# Patient Record
Sex: FEMALE | Race: WHITE | Employment: OTHER | URBAN - METROPOLITAN AREA
[De-identification: names, ages, dates, MRNs, and addresses within clinical notes are randomized per-mention and may not be internally consistent; named-entity substitution may affect disease eponyms.]

---

## 2018-02-23 ENCOUNTER — OFFICE VISIT (OUTPATIENT)
Dept: HEMATOLOGY/ONCOLOGY | Age: 83
End: 2018-02-23
Attending: INTERNAL MEDICINE
Payer: MEDICARE

## 2018-02-23 VITALS
HEIGHT: 64 IN | BODY MASS INDEX: 26.53 KG/M2 | RESPIRATION RATE: 18 BRPM | SYSTOLIC BLOOD PRESSURE: 194 MMHG | TEMPERATURE: 97 F | WEIGHT: 155.38 LBS | HEART RATE: 65 BPM | OXYGEN SATURATION: 99 % | DIASTOLIC BLOOD PRESSURE: 83 MMHG

## 2018-02-23 DIAGNOSIS — Z17.0 CARCINOMA OF OVERLAPPING SITES OF LEFT BREAST IN FEMALE, ESTROGEN RECEPTOR POSITIVE (HCC): Primary | ICD-10-CM

## 2018-02-23 DIAGNOSIS — C50.812 CARCINOMA OF OVERLAPPING SITES OF LEFT BREAST IN FEMALE, ESTROGEN RECEPTOR POSITIVE (HCC): Primary | ICD-10-CM

## 2018-02-23 DIAGNOSIS — Z51.81 ENCOUNTER FOR MONITORING CARDIOTOXIC DRUG THERAPY: ICD-10-CM

## 2018-02-23 DIAGNOSIS — Z79.899 ENCOUNTER FOR MONITORING CARDIOTOXIC DRUG THERAPY: ICD-10-CM

## 2018-02-23 PROCEDURE — 99205 OFFICE O/P NEW HI 60 MIN: CPT | Performed by: INTERNAL MEDICINE

## 2018-02-23 NOTE — PROGRESS NOTES
Request for medical records sent to [facility] at [facility]

## 2018-02-26 ENCOUNTER — HOSPITAL ENCOUNTER (OUTPATIENT)
Dept: CV DIAGNOSTICS | Facility: HOSPITAL | Age: 83
Discharge: HOME | End: 2018-02-26
Attending: INTERNAL MEDICINE
Payer: MEDICARE

## 2018-02-26 ENCOUNTER — OFFICE VISIT (OUTPATIENT)
Dept: HEMATOLOGY/ONCOLOGY | Age: 83
End: 2018-02-26
Attending: INTERNAL MEDICINE
Payer: MEDICARE

## 2018-02-26 DIAGNOSIS — Z17.0 CARCINOMA OF OVERLAPPING SITES OF LEFT BREAST IN FEMALE, ESTROGEN RECEPTOR POSITIVE (HCC): Primary | ICD-10-CM

## 2018-02-26 DIAGNOSIS — Z79.899 ENCOUNTER FOR MONITORING CARDIOTOXIC DRUG THERAPY: ICD-10-CM

## 2018-02-26 DIAGNOSIS — C50.812 CARCINOMA OF OVERLAPPING SITES OF LEFT BREAST IN FEMALE, ESTROGEN RECEPTOR POSITIVE (HCC): Primary | ICD-10-CM

## 2018-02-26 DIAGNOSIS — C50.812 CARCINOMA OF OVERLAPPING SITES OF LEFT BREAST IN FEMALE, ESTROGEN RECEPTOR POSITIVE (HCC): ICD-10-CM

## 2018-02-26 DIAGNOSIS — Z71.9 ENCOUNTER FOR EDUCATION: ICD-10-CM

## 2018-02-26 DIAGNOSIS — Z51.81 ENCOUNTER FOR MONITORING CARDIOTOXIC DRUG THERAPY: ICD-10-CM

## 2018-02-26 DIAGNOSIS — Z17.0 CARCINOMA OF OVERLAPPING SITES OF LEFT BREAST IN FEMALE, ESTROGEN RECEPTOR POSITIVE (HCC): ICD-10-CM

## 2018-02-26 PROCEDURE — 93306 TTE W/DOPPLER COMPLETE: CPT | Performed by: INTERNAL MEDICINE

## 2018-02-26 PROCEDURE — 99214 OFFICE O/P EST MOD 30 MIN: CPT | Performed by: NURSE PRACTITIONER

## 2018-02-26 RX ORDER — ONDANSETRON HYDROCHLORIDE 8 MG/1
8 TABLET, FILM COATED ORAL EVERY 8 HOURS PRN
Qty: 30 TABLET | Refills: 3 | Status: SHIPPED | OUTPATIENT
Start: 2018-02-26 | End: 2019-04-12

## 2018-02-26 RX ORDER — PROCHLORPERAZINE MALEATE 10 MG
10 TABLET ORAL EVERY 6 HOURS PRN
Qty: 30 TABLET | Refills: 3 | Status: SHIPPED | OUTPATIENT
Start: 2018-02-26 | End: 2019-04-12

## 2018-02-26 NOTE — CONSULTS
Cancer Center Report of Consultation    Patient Name: Alex April   YOB: 1930   Medical Record Number: [id number]   CSN: [id number]   Consulting Physician: Sofia Salinas MD  Referring Physician(s): No ref.  provider found  Date of Consultati the summer. She will be staying with her daughter. She has been very active. She has no complaint of pain. She has no fever or sweats. She has no dyspnea or cough. She has no abdominal pain. Her appetite is good. Past Medical History:  History reviewed. Signs:   BP (!) 194/83 (BP Location: Right leg, Patient Position: Sitting, Cuff Size: large)   Pulse 65   Temp (!) 97.2 °F (36.2 °C) (Tympanic)   Resp 18   Ht 1.626 m (5' 4\")   Wt 70.5 kg (155 lb 6.4 oz)   SpO2 99%   BMI 26.67 kg/m²     Physical Examinatio that she would have difficulty tolerating this regimen given her age. I discussed the option of low dose weekly paclitaxel with trastuzumab (+/- pertuzumab). I discussed this regimen in detail.  I discussed the significant risk of neuropathy in her given he

## 2018-02-26 NOTE — PROGRESS NOTES
IV Chemotherapy Education    Patient: Kinsey Green   Date: 2/26/18   Diagnosis:  Breast cancer  Caregivers present:     Drug names: Pertuzumab, Taxol, Herceptin    Treatment Effects on Bone Marrow:  Chemotherapy action on cancer / normal cells}  Function management, when to call provider and contact information.      Coreen Harris FNP-BC  [facility]

## 2018-02-27 PROBLEM — M19.90 OSTEOARTHROSIS: Status: ACTIVE | Noted: 2018-02-27

## 2018-02-27 PROBLEM — M81.0 OSTEOPOROSIS: Status: ACTIVE | Noted: 2018-02-27

## 2018-03-02 ENCOUNTER — APPOINTMENT (OUTPATIENT)
Dept: HEMATOLOGY/ONCOLOGY | Age: 83
End: 2018-03-02
Attending: INTERNAL MEDICINE
Payer: MEDICARE

## 2018-03-07 ENCOUNTER — LAB ENCOUNTER (OUTPATIENT)
Dept: LAB | Facility: HOSPITAL | Age: 83
End: 2018-03-07
Attending: INTERNAL MEDICINE
Payer: MEDICARE

## 2018-03-07 DIAGNOSIS — C80.1 DUCTAL CARCINOMA (HCC): Primary | ICD-10-CM

## 2018-03-07 DIAGNOSIS — C50.812 CARCINOMA OF OVERLAPPING SITES OF LEFT BREAST IN FEMALE, ESTROGEN RECEPTOR POSITIVE (HCC): Primary | ICD-10-CM

## 2018-03-07 DIAGNOSIS — Z17.0 CARCINOMA OF OVERLAPPING SITES OF LEFT BREAST IN FEMALE, ESTROGEN RECEPTOR POSITIVE (HCC): Primary | ICD-10-CM

## 2018-03-07 PROCEDURE — 88321 CONSLTJ&REPRT SLD PREP ELSWR: CPT

## 2018-03-09 ENCOUNTER — OFFICE VISIT (OUTPATIENT)
Dept: HEMATOLOGY/ONCOLOGY | Age: 83
End: 2018-03-09
Attending: INTERNAL MEDICINE
Payer: MEDICARE

## 2018-03-09 VITALS
WEIGHT: 154.19 LBS | DIASTOLIC BLOOD PRESSURE: 79 MMHG | BODY MASS INDEX: 26 KG/M2 | HEART RATE: 64 BPM | OXYGEN SATURATION: 98 % | TEMPERATURE: 98 F | RESPIRATION RATE: 18 BRPM | SYSTOLIC BLOOD PRESSURE: 188 MMHG

## 2018-03-09 DIAGNOSIS — Z17.0 CARCINOMA OF OVERLAPPING SITES OF LEFT BREAST IN FEMALE, ESTROGEN RECEPTOR POSITIVE (HCC): ICD-10-CM

## 2018-03-09 DIAGNOSIS — C50.812 CARCINOMA OF OVERLAPPING SITES OF LEFT BREAST IN FEMALE, ESTROGEN RECEPTOR POSITIVE (HCC): Primary | ICD-10-CM

## 2018-03-09 DIAGNOSIS — Z17.0 CARCINOMA OF OVERLAPPING SITES OF LEFT BREAST IN FEMALE, ESTROGEN RECEPTOR POSITIVE (HCC): Primary | ICD-10-CM

## 2018-03-09 DIAGNOSIS — C50.812 CARCINOMA OF OVERLAPPING SITES OF LEFT BREAST IN FEMALE, ESTROGEN RECEPTOR POSITIVE (HCC): ICD-10-CM

## 2018-03-09 LAB
ALBUMIN SERPL-MCNC: 3.6 G/DL (ref 3.5–4.8)
ALP LIVER SERPL-CCNC: 57 U/L (ref 55–142)
ALT SERPL-CCNC: 35 U/L (ref 14–54)
AST SERPL-CCNC: 28 U/L (ref 15–41)
BASOPHILS # BLD AUTO: 0.03 X10(3) UL (ref 0–0.1)
BASOPHILS NFR BLD AUTO: 0.5 %
BILIRUB SERPL-MCNC: 0.5 MG/DL (ref 0.1–2)
BUN BLD-MCNC: 22 MG/DL (ref 8–20)
CALCIUM BLD-MCNC: 8.7 MG/DL (ref 8.3–10.3)
CHLORIDE: 107 MMOL/L (ref 101–111)
CO2: 30 MMOL/L (ref 22–32)
CREAT BLD-MCNC: 0.85 MG/DL (ref 0.55–1.02)
EOSINOPHIL # BLD AUTO: 0.45 X10(3) UL (ref 0–0.3)
EOSINOPHIL NFR BLD AUTO: 7.1 %
ERYTHROCYTE [DISTWIDTH] IN BLOOD BY AUTOMATED COUNT: 13.8 % (ref 11.5–16)
GLUCOSE BLD-MCNC: 112 MG/DL (ref 70–99)
HCT VFR BLD AUTO: 38.7 % (ref 34–50)
HGB BLD-MCNC: 12.7 G/DL (ref 12–16)
IMMATURE GRANULOCYTE COUNT: 0.01 X10(3) UL (ref 0–1)
IMMATURE GRANULOCYTE RATIO %: 0.2 %
LYMPHOCYTES # BLD AUTO: 1.95 X10(3) UL (ref 0.9–4)
LYMPHOCYTES NFR BLD AUTO: 30.9 %
M PROTEIN MFR SERPL ELPH: 6.6 G/DL (ref 6.1–8.3)
MCH RBC QN AUTO: 30 PG (ref 27–33.2)
MCHC RBC AUTO-ENTMCNC: 32.8 G/DL (ref 31–37)
MCV RBC AUTO: 91.5 FL (ref 81–100)
MONOCYTES # BLD AUTO: 0.35 X10(3) UL (ref 0.1–1)
MONOCYTES NFR BLD AUTO: 5.5 %
NEUTROPHIL ABS PRELIM: 3.52 X10 (3) UL (ref 1.3–6.7)
NEUTROPHILS # BLD AUTO: 3.52 X10(3) UL (ref 1.3–6.7)
NEUTROPHILS NFR BLD AUTO: 55.8 %
PLATELET # BLD AUTO: 172 10(3)UL (ref 150–450)
POTASSIUM SERPL-SCNC: 4.2 MMOL/L (ref 3.6–5.1)
RBC # BLD AUTO: 4.23 X10(6)UL (ref 3.8–5.1)
RED CELL DISTRIBUTION WIDTH-SD: 46.5 FL (ref 35.1–46.3)
SODIUM SERPL-SCNC: 144 MMOL/L (ref 136–144)
WBC # BLD AUTO: 6.3 X10(3) UL (ref 4–13)

## 2018-03-09 PROCEDURE — 96413 CHEMO IV INFUSION 1 HR: CPT

## 2018-03-09 PROCEDURE — S0028 INJECTION, FAMOTIDINE, 20 MG: HCPCS | Performed by: INTERNAL MEDICINE

## 2018-03-09 PROCEDURE — 96375 TX/PRO/DX INJ NEW DRUG ADDON: CPT

## 2018-03-09 PROCEDURE — 96411 CHEMO IV PUSH ADDL DRUG: CPT

## 2018-03-09 PROCEDURE — 99214 OFFICE O/P EST MOD 30 MIN: CPT | Performed by: INTERNAL MEDICINE

## 2018-03-09 PROCEDURE — 96417 CHEMO IV INFUS EACH ADDL SEQ: CPT

## 2018-03-09 RX ORDER — LORAZEPAM 2 MG/ML
INJECTION INTRAMUSCULAR EVERY 4 HOURS PRN
Status: CANCELLED | OUTPATIENT
Start: 2018-03-09

## 2018-03-09 RX ORDER — PROCHLORPERAZINE MALEATE 10 MG
10 TABLET ORAL EVERY 6 HOURS PRN
Status: CANCELLED | OUTPATIENT
Start: 2018-03-16

## 2018-03-09 RX ORDER — ACETAMINOPHEN 325 MG/1
650 TABLET ORAL ONCE
Status: CANCELLED | OUTPATIENT
Start: 2018-03-09

## 2018-03-09 RX ORDER — METOCLOPRAMIDE HYDROCHLORIDE 5 MG/ML
10 INJECTION INTRAMUSCULAR; INTRAVENOUS EVERY 6 HOURS PRN
Status: CANCELLED | OUTPATIENT
Start: 2018-03-09

## 2018-03-09 RX ORDER — ONDANSETRON 2 MG/ML
8 INJECTION INTRAMUSCULAR; INTRAVENOUS EVERY 6 HOURS PRN
Status: CANCELLED | OUTPATIENT
Start: 2018-03-23

## 2018-03-09 RX ORDER — METOCLOPRAMIDE HYDROCHLORIDE 5 MG/ML
10 INJECTION INTRAMUSCULAR; INTRAVENOUS EVERY 6 HOURS PRN
Status: CANCELLED | OUTPATIENT
Start: 2018-03-16

## 2018-03-09 RX ORDER — DIPHENHYDRAMINE HYDROCHLORIDE 50 MG/ML
25 INJECTION INTRAMUSCULAR; INTRAVENOUS ONCE
Status: COMPLETED | OUTPATIENT
Start: 2018-03-09 | End: 2018-03-09

## 2018-03-09 RX ORDER — LORAZEPAM 2 MG/ML
INJECTION INTRAMUSCULAR EVERY 4 HOURS PRN
Status: CANCELLED | OUTPATIENT
Start: 2018-03-16

## 2018-03-09 RX ORDER — LORAZEPAM 2 MG/ML
INJECTION INTRAMUSCULAR EVERY 4 HOURS PRN
Status: CANCELLED | OUTPATIENT
Start: 2018-03-23

## 2018-03-09 RX ORDER — ACETAMINOPHEN 325 MG/1
650 TABLET ORAL ONCE
Status: COMPLETED | OUTPATIENT
Start: 2018-03-09 | End: 2018-03-09

## 2018-03-09 RX ORDER — ONDANSETRON 2 MG/ML
8 INJECTION INTRAMUSCULAR; INTRAVENOUS EVERY 6 HOURS PRN
Status: CANCELLED | OUTPATIENT
Start: 2018-03-09

## 2018-03-09 RX ORDER — FAMOTIDINE 10 MG/ML
20 INJECTION, SOLUTION INTRAVENOUS ONCE
Status: COMPLETED | OUTPATIENT
Start: 2018-03-09 | End: 2018-03-09

## 2018-03-09 RX ORDER — LORAZEPAM 0.5 MG/1
TABLET ORAL EVERY 4 HOURS PRN
Status: CANCELLED | OUTPATIENT
Start: 2018-03-09

## 2018-03-09 RX ORDER — LORAZEPAM 0.5 MG/1
TABLET ORAL EVERY 4 HOURS PRN
Status: CANCELLED | OUTPATIENT
Start: 2018-03-23

## 2018-03-09 RX ORDER — LORAZEPAM 0.5 MG/1
TABLET ORAL EVERY 4 HOURS PRN
Status: CANCELLED | OUTPATIENT
Start: 2018-03-16

## 2018-03-09 RX ORDER — METOCLOPRAMIDE HYDROCHLORIDE 5 MG/ML
10 INJECTION INTRAMUSCULAR; INTRAVENOUS EVERY 6 HOURS PRN
Status: CANCELLED | OUTPATIENT
Start: 2018-03-23

## 2018-03-09 RX ORDER — ONDANSETRON 2 MG/ML
8 INJECTION INTRAMUSCULAR; INTRAVENOUS EVERY 6 HOURS PRN
Status: CANCELLED | OUTPATIENT
Start: 2018-03-16

## 2018-03-09 RX ORDER — PROCHLORPERAZINE MALEATE 10 MG
10 TABLET ORAL EVERY 6 HOURS PRN
Status: CANCELLED | OUTPATIENT
Start: 2018-03-09

## 2018-03-09 RX ORDER — PROCHLORPERAZINE MALEATE 10 MG
10 TABLET ORAL EVERY 6 HOURS PRN
Status: CANCELLED | OUTPATIENT
Start: 2018-03-23

## 2018-03-09 RX ADMIN — ACETAMINOPHEN 650 MG: 325 TABLET ORAL at 11:07:00

## 2018-03-09 RX ADMIN — DIPHENHYDRAMINE HYDROCHLORIDE 25 MG: 50 INJECTION INTRAMUSCULAR; INTRAVENOUS at 11:00:00

## 2018-03-09 RX ADMIN — FAMOTIDINE 20 MG: 10 INJECTION, SOLUTION INTRAVENOUS at 10:59:00

## 2018-03-09 NOTE — PATIENT INSTRUCTIONS
Take Zofran 8mg tonight around 7:00pm then every 8 hours Sat/Sun at least. If still having nausea with Zofran, may alternate with Compazine every 4 hours.

## 2018-03-09 NOTE — PROGRESS NOTES
Cancer Center Progress Note    Problem List:      Patient Active Problem List:     Carcinoma of overlapping sites of left breast in female, estrogen receptor positive ([facility] [address]     Osteoporosis     Osteoarthrosis      Interim History:    I did obtain the outsi edema. No calf tenderness.       Labs:       Lab Results  Component Value Date   WBC 6.3 03/09/2018   RBC 4.23 03/09/2018   HGB 12.7 03/09/2018   HCT 38.7 03/09/2018   MCV 91.5 03/09/2018   MCH 30.0 03/09/2018   MCHC 32.8 03/09/2018   RDW 13.8 03/09/2018

## 2018-03-13 ENCOUNTER — TELEPHONE (OUTPATIENT)
Dept: HEMATOLOGY/ONCOLOGY | Facility: HOSPITAL | Age: 83
End: 2018-03-13

## 2018-03-13 NOTE — TELEPHONE ENCOUNTER
Date of Treatment: 3/9/18                                Type of Chemo: Taxol/Herceptin    Comments: LM for patient at listed home number.     Recommendations: pt scheduled for this Fri- encouraged her to call back with any issues or questions prior to this

## 2018-03-16 ENCOUNTER — OFFICE VISIT (OUTPATIENT)
Dept: HEMATOLOGY/ONCOLOGY | Age: 83
End: 2018-03-16
Attending: INTERNAL MEDICINE
Payer: MEDICARE

## 2018-03-16 VITALS
HEART RATE: 63 BPM | TEMPERATURE: 98 F | DIASTOLIC BLOOD PRESSURE: 72 MMHG | SYSTOLIC BLOOD PRESSURE: 188 MMHG | BODY MASS INDEX: 26 KG/M2 | RESPIRATION RATE: 16 BRPM | WEIGHT: 154.31 LBS | OXYGEN SATURATION: 100 %

## 2018-03-16 DIAGNOSIS — Z17.0 CARCINOMA OF OVERLAPPING SITES OF LEFT BREAST IN FEMALE, ESTROGEN RECEPTOR POSITIVE (HCC): Primary | ICD-10-CM

## 2018-03-16 DIAGNOSIS — C50.812 CARCINOMA OF OVERLAPPING SITES OF LEFT BREAST IN FEMALE, ESTROGEN RECEPTOR POSITIVE (HCC): Primary | ICD-10-CM

## 2018-03-16 LAB
BASOPHILS # BLD AUTO: 0.03 X10(3) UL (ref 0–0.1)
BASOPHILS NFR BLD AUTO: 0.6 %
EOSINOPHIL # BLD AUTO: 0.35 X10(3) UL (ref 0–0.3)
EOSINOPHIL NFR BLD AUTO: 7.2 %
ERYTHROCYTE [DISTWIDTH] IN BLOOD BY AUTOMATED COUNT: 13.8 % (ref 11.5–16)
HCT VFR BLD AUTO: 37.4 % (ref 34–50)
HGB BLD-MCNC: 12 G/DL (ref 12–16)
IMMATURE GRANULOCYTE COUNT: 0.03 X10(3) UL (ref 0–1)
IMMATURE GRANULOCYTE RATIO %: 0.6 %
LYMPHOCYTES # BLD AUTO: 1.77 X10(3) UL (ref 0.9–4)
LYMPHOCYTES NFR BLD AUTO: 36.6 %
MCH RBC QN AUTO: 29.6 PG (ref 27–33.2)
MCHC RBC AUTO-ENTMCNC: 32.1 G/DL (ref 31–37)
MCV RBC AUTO: 92.1 FL (ref 81–100)
MONOCYTES # BLD AUTO: 0.21 X10(3) UL (ref 0.1–1)
MONOCYTES NFR BLD AUTO: 4.3 %
NEUTROPHIL ABS PRELIM: 2.45 X10 (3) UL (ref 1.3–6.7)
NEUTROPHILS # BLD AUTO: 2.45 X10(3) UL (ref 1.3–6.7)
NEUTROPHILS NFR BLD AUTO: 50.7 %
PLATELET # BLD AUTO: 224 10(3)UL (ref 150–450)
RBC # BLD AUTO: 4.06 X10(6)UL (ref 3.8–5.1)
RED CELL DISTRIBUTION WIDTH-SD: 47.1 FL (ref 35.1–46.3)
WBC # BLD AUTO: 4.8 X10(3) UL (ref 4–13)

## 2018-03-16 PROCEDURE — 96413 CHEMO IV INFUSION 1 HR: CPT

## 2018-03-16 PROCEDURE — 85025 COMPLETE CBC W/AUTO DIFF WBC: CPT

## 2018-03-16 PROCEDURE — S0028 INJECTION, FAMOTIDINE, 20 MG: HCPCS | Performed by: INTERNAL MEDICINE

## 2018-03-16 PROCEDURE — 96417 CHEMO IV INFUS EACH ADDL SEQ: CPT

## 2018-03-16 PROCEDURE — 96375 TX/PRO/DX INJ NEW DRUG ADDON: CPT

## 2018-03-16 RX ORDER — FAMOTIDINE 10 MG/ML
20 INJECTION, SOLUTION INTRAVENOUS ONCE
Status: CANCELLED
Start: 2018-03-23 | End: 2018-03-23

## 2018-03-16 RX ORDER — FAMOTIDINE 10 MG/ML
20 INJECTION, SOLUTION INTRAVENOUS ONCE
Status: COMPLETED | OUTPATIENT
Start: 2018-03-16 | End: 2018-03-16

## 2018-03-16 RX ORDER — DIPHENHYDRAMINE HYDROCHLORIDE 50 MG/ML
25 INJECTION INTRAMUSCULAR; INTRAVENOUS ONCE
Status: COMPLETED | OUTPATIENT
Start: 2018-03-16 | End: 2018-03-16

## 2018-03-16 RX ORDER — DIPHENHYDRAMINE HYDROCHLORIDE 50 MG/ML
25 INJECTION INTRAMUSCULAR; INTRAVENOUS ONCE
Status: CANCELLED
Start: 2018-03-23 | End: 2018-03-23

## 2018-03-16 RX ORDER — SODIUM CHLORIDE 0.9 % (FLUSH) 0.9 %
10 SYRINGE (ML) INJECTION ONCE
Status: CANCELLED | OUTPATIENT
Start: 2018-03-16

## 2018-03-16 RX ORDER — SODIUM CHLORIDE 0.9 % (FLUSH) 0.9 %
10 SYRINGE (ML) INJECTION ONCE
Status: DISCONTINUED | OUTPATIENT
Start: 2018-03-16 | End: 2018-03-16

## 2018-03-16 RX ADMIN — DIPHENHYDRAMINE HYDROCHLORIDE 25 MG: 50 INJECTION INTRAMUSCULAR; INTRAVENOUS at 10:45:00

## 2018-03-16 RX ADMIN — FAMOTIDINE 20 MG: 10 INJECTION, SOLUTION INTRAVENOUS at 10:46:00

## 2018-03-16 NOTE — PROGRESS NOTES
Education Record  Learner:  Patient and daughter  Disease / Diagnosis:   Breast cancer  Barriers / Limitations:  None  Method:  Printed material and Reinforcement  General Topics:  Plan of care reviewed; labs; side effects potential  Outcome:  Shows unders

## 2018-03-23 ENCOUNTER — OFFICE VISIT (OUTPATIENT)
Dept: HEMATOLOGY/ONCOLOGY | Age: 83
End: 2018-03-23
Attending: INTERNAL MEDICINE
Payer: MEDICARE

## 2018-03-23 VITALS
WEIGHT: 154 LBS | BODY MASS INDEX: 26 KG/M2 | OXYGEN SATURATION: 99 % | HEART RATE: 62 BPM | RESPIRATION RATE: 18 BRPM | SYSTOLIC BLOOD PRESSURE: 182 MMHG | TEMPERATURE: 98 F | DIASTOLIC BLOOD PRESSURE: 82 MMHG

## 2018-03-23 DIAGNOSIS — C50.812 CARCINOMA OF OVERLAPPING SITES OF LEFT BREAST IN FEMALE, ESTROGEN RECEPTOR POSITIVE (HCC): Primary | ICD-10-CM

## 2018-03-23 DIAGNOSIS — Z17.0 CARCINOMA OF OVERLAPPING SITES OF LEFT BREAST IN FEMALE, ESTROGEN RECEPTOR POSITIVE (HCC): Primary | ICD-10-CM

## 2018-03-23 LAB
BASOPHILS # BLD AUTO: 0.02 X10(3) UL (ref 0–0.1)
BASOPHILS NFR BLD AUTO: 0.5 %
EOSINOPHIL # BLD AUTO: 0.21 X10(3) UL (ref 0–0.3)
EOSINOPHIL NFR BLD AUTO: 5.3 %
ERYTHROCYTE [DISTWIDTH] IN BLOOD BY AUTOMATED COUNT: 14.1 % (ref 11.5–16)
HCT VFR BLD AUTO: 36 % (ref 34–50)
HGB BLD-MCNC: 11.7 G/DL (ref 12–16)
IMMATURE GRANULOCYTE COUNT: 0.01 X10(3) UL (ref 0–1)
IMMATURE GRANULOCYTE RATIO %: 0.3 %
LYMPHOCYTES # BLD AUTO: 1.53 X10(3) UL (ref 0.9–4)
LYMPHOCYTES NFR BLD AUTO: 38.5 %
MCH RBC QN AUTO: 29.8 PG (ref 27–33.2)
MCHC RBC AUTO-ENTMCNC: 32.5 G/DL (ref 31–37)
MCV RBC AUTO: 91.8 FL (ref 81–100)
MONOCYTES # BLD AUTO: 0.23 X10(3) UL (ref 0.1–1)
MONOCYTES NFR BLD AUTO: 5.8 %
NEUTROPHIL ABS PRELIM: 1.97 X10 (3) UL (ref 1.3–6.7)
NEUTROPHILS # BLD AUTO: 1.97 X10(3) UL (ref 1.3–6.7)
NEUTROPHILS NFR BLD AUTO: 49.6 %
PLATELET # BLD AUTO: 222 10(3)UL (ref 150–450)
RBC # BLD AUTO: 3.92 X10(6)UL (ref 3.8–5.1)
RED CELL DISTRIBUTION WIDTH-SD: 47.8 FL (ref 35.1–46.3)
WBC # BLD AUTO: 4 X10(3) UL (ref 4–13)

## 2018-03-23 PROCEDURE — 85025 COMPLETE CBC W/AUTO DIFF WBC: CPT

## 2018-03-23 PROCEDURE — 96375 TX/PRO/DX INJ NEW DRUG ADDON: CPT

## 2018-03-23 PROCEDURE — 99214 OFFICE O/P EST MOD 30 MIN: CPT | Performed by: INTERNAL MEDICINE

## 2018-03-23 PROCEDURE — 96413 CHEMO IV INFUSION 1 HR: CPT

## 2018-03-23 PROCEDURE — 96417 CHEMO IV INFUS EACH ADDL SEQ: CPT

## 2018-03-23 PROCEDURE — S0028 INJECTION, FAMOTIDINE, 20 MG: HCPCS | Performed by: INTERNAL MEDICINE

## 2018-03-23 RX ORDER — PROCHLORPERAZINE MALEATE 10 MG
10 TABLET ORAL EVERY 6 HOURS PRN
Status: CANCELLED | OUTPATIENT
Start: 2018-03-30

## 2018-03-23 RX ORDER — LORAZEPAM 0.5 MG/1
TABLET ORAL EVERY 4 HOURS PRN
Status: CANCELLED | OUTPATIENT
Start: 2018-04-06

## 2018-03-23 RX ORDER — METOCLOPRAMIDE HYDROCHLORIDE 5 MG/ML
10 INJECTION INTRAMUSCULAR; INTRAVENOUS EVERY 6 HOURS PRN
Status: CANCELLED | OUTPATIENT
Start: 2018-04-06

## 2018-03-23 RX ORDER — LORAZEPAM 2 MG/ML
INJECTION INTRAMUSCULAR EVERY 4 HOURS PRN
Status: CANCELLED | OUTPATIENT
Start: 2018-04-06

## 2018-03-23 RX ORDER — LORAZEPAM 0.5 MG/1
TABLET ORAL EVERY 4 HOURS PRN
Status: CANCELLED | OUTPATIENT
Start: 2018-03-30

## 2018-03-23 RX ORDER — LORAZEPAM 2 MG/ML
INJECTION INTRAMUSCULAR EVERY 4 HOURS PRN
Status: CANCELLED | OUTPATIENT
Start: 2018-04-13

## 2018-03-23 RX ORDER — PROCHLORPERAZINE MALEATE 10 MG
10 TABLET ORAL EVERY 6 HOURS PRN
Status: CANCELLED | OUTPATIENT
Start: 2018-04-13

## 2018-03-23 RX ORDER — ONDANSETRON 2 MG/ML
8 INJECTION INTRAMUSCULAR; INTRAVENOUS EVERY 6 HOURS PRN
Status: CANCELLED | OUTPATIENT
Start: 2018-04-13

## 2018-03-23 RX ORDER — LORAZEPAM 2 MG/ML
INJECTION INTRAMUSCULAR EVERY 4 HOURS PRN
Status: CANCELLED | OUTPATIENT
Start: 2018-03-30

## 2018-03-23 RX ORDER — SODIUM CHLORIDE 0.9 % (FLUSH) 0.9 %
10 SYRINGE (ML) INJECTION ONCE
Status: COMPLETED | OUTPATIENT
Start: 2018-03-23 | End: 2018-03-23

## 2018-03-23 RX ORDER — FAMOTIDINE 10 MG/ML
20 INJECTION, SOLUTION INTRAVENOUS ONCE
Status: COMPLETED | OUTPATIENT
Start: 2018-03-23 | End: 2018-03-23

## 2018-03-23 RX ORDER — METOCLOPRAMIDE HYDROCHLORIDE 5 MG/ML
10 INJECTION INTRAMUSCULAR; INTRAVENOUS EVERY 6 HOURS PRN
Status: CANCELLED | OUTPATIENT
Start: 2018-04-13

## 2018-03-23 RX ORDER — DIPHENHYDRAMINE HYDROCHLORIDE 50 MG/ML
25 INJECTION INTRAMUSCULAR; INTRAVENOUS ONCE
Status: CANCELLED
Start: 2018-04-06 | End: 2018-04-06

## 2018-03-23 RX ORDER — DIPHENHYDRAMINE HYDROCHLORIDE 50 MG/ML
25 INJECTION INTRAMUSCULAR; INTRAVENOUS ONCE
Status: CANCELLED
Start: 2018-04-13 | End: 2018-04-13

## 2018-03-23 RX ORDER — SODIUM CHLORIDE 0.9 % (FLUSH) 0.9 %
10 SYRINGE (ML) INJECTION ONCE
Status: CANCELLED | OUTPATIENT
Start: 2018-03-23

## 2018-03-23 RX ORDER — ONDANSETRON 2 MG/ML
8 INJECTION INTRAMUSCULAR; INTRAVENOUS EVERY 6 HOURS PRN
Status: CANCELLED | OUTPATIENT
Start: 2018-03-30

## 2018-03-23 RX ORDER — METOCLOPRAMIDE HYDROCHLORIDE 5 MG/ML
10 INJECTION INTRAMUSCULAR; INTRAVENOUS EVERY 6 HOURS PRN
Status: CANCELLED | OUTPATIENT
Start: 2018-03-30

## 2018-03-23 RX ORDER — FAMOTIDINE 10 MG/ML
20 INJECTION, SOLUTION INTRAVENOUS ONCE
Status: CANCELLED
Start: 2018-04-13 | End: 2018-04-13

## 2018-03-23 RX ORDER — ONDANSETRON 2 MG/ML
8 INJECTION INTRAMUSCULAR; INTRAVENOUS EVERY 6 HOURS PRN
Status: CANCELLED | OUTPATIENT
Start: 2018-04-06

## 2018-03-23 RX ORDER — DIPHENHYDRAMINE HYDROCHLORIDE 50 MG/ML
25 INJECTION INTRAMUSCULAR; INTRAVENOUS ONCE
Status: CANCELLED
Start: 2018-03-30 | End: 2018-03-30

## 2018-03-23 RX ORDER — LORAZEPAM 0.5 MG/1
TABLET ORAL EVERY 4 HOURS PRN
Status: CANCELLED | OUTPATIENT
Start: 2018-04-13

## 2018-03-23 RX ORDER — PROCHLORPERAZINE MALEATE 10 MG
10 TABLET ORAL EVERY 6 HOURS PRN
Status: CANCELLED | OUTPATIENT
Start: 2018-04-06

## 2018-03-23 RX ORDER — FAMOTIDINE 10 MG/ML
20 INJECTION, SOLUTION INTRAVENOUS ONCE
Status: CANCELLED
Start: 2018-04-06 | End: 2018-04-06

## 2018-03-23 RX ORDER — FAMOTIDINE 10 MG/ML
20 INJECTION, SOLUTION INTRAVENOUS ONCE
Status: CANCELLED
Start: 2018-03-30 | End: 2018-03-30

## 2018-03-23 RX ORDER — DIPHENHYDRAMINE HYDROCHLORIDE 50 MG/ML
25 INJECTION INTRAMUSCULAR; INTRAVENOUS ONCE
Status: COMPLETED | OUTPATIENT
Start: 2018-03-23 | End: 2018-03-23

## 2018-03-23 RX ADMIN — DIPHENHYDRAMINE HYDROCHLORIDE 25 MG: 50 INJECTION INTRAMUSCULAR; INTRAVENOUS at 12:11:00

## 2018-03-23 RX ADMIN — FAMOTIDINE 20 MG: 10 INJECTION, SOLUTION INTRAVENOUS at 12:19:00

## 2018-03-23 RX ADMIN — SODIUM CHLORIDE 0.9 % (FLUSH) 10 ML: 0.9 % SYRINGE (ML) INJECTION at 14:28:00

## 2018-03-23 NOTE — PROGRESS NOTES
Cancer Center Progress Note    Problem List:      Patient Active Problem List:     Carcinoma of overlapping sites of left breast in female, estrogen receptor positive ([address]     Osteoporosis     Osteoarthrosis      Interim History:    She started weekly pac Examination:    General: Patient is alert and oriented x 3, not in acute distress. Neck: No palpable lymphadenopathy. Neck is supple. Chest: Clear to auscultation. No rales. No wheezes. Heart: Regular rate and rhythm.    Abdomen: Soft, non tender with g

## 2018-03-23 NOTE — PROGRESS NOTES
Education Record  Learner:  Patient  Disease / Diagnosis:   Breast cancer; schedule; labs  Barriers / Limitations:  None  Method:  Printed material and Reinforcement  General Topics:  Plan of care reviewed  Outcome:  Shows understanding and Patient given c

## 2018-03-30 ENCOUNTER — OFFICE VISIT (OUTPATIENT)
Dept: HEMATOLOGY/ONCOLOGY | Age: 83
End: 2018-03-30
Attending: INTERNAL MEDICINE
Payer: MEDICARE

## 2018-03-30 VITALS
OXYGEN SATURATION: 97 % | TEMPERATURE: 98 F | RESPIRATION RATE: 18 BRPM | SYSTOLIC BLOOD PRESSURE: 205 MMHG | BODY MASS INDEX: 26 KG/M2 | DIASTOLIC BLOOD PRESSURE: 84 MMHG | HEART RATE: 62 BPM | WEIGHT: 153.88 LBS

## 2018-03-30 DIAGNOSIS — Z17.0 CARCINOMA OF OVERLAPPING SITES OF LEFT BREAST IN FEMALE, ESTROGEN RECEPTOR POSITIVE (HCC): Primary | ICD-10-CM

## 2018-03-30 DIAGNOSIS — C50.812 CARCINOMA OF OVERLAPPING SITES OF LEFT BREAST IN FEMALE, ESTROGEN RECEPTOR POSITIVE (HCC): Primary | ICD-10-CM

## 2018-03-30 LAB
ALBUMIN SERPL-MCNC: 3.4 G/DL (ref 3.5–4.8)
ALP LIVER SERPL-CCNC: 56 U/L (ref 55–142)
ALT SERPL-CCNC: 47 U/L (ref 14–54)
AST SERPL-CCNC: 27 U/L (ref 15–41)
BASOPHILS # BLD AUTO: 0.05 X10(3) UL (ref 0–0.1)
BASOPHILS NFR BLD AUTO: 1.1 %
BILIRUB SERPL-MCNC: 0.3 MG/DL (ref 0.1–2)
BUN BLD-MCNC: 21 MG/DL (ref 8–20)
CALCIUM BLD-MCNC: 8.6 MG/DL (ref 8.3–10.3)
CHLORIDE: 104 MMOL/L (ref 101–111)
CO2: 29 MMOL/L (ref 22–32)
CREAT BLD-MCNC: 0.73 MG/DL (ref 0.55–1.02)
EOSINOPHIL # BLD AUTO: 0.08 X10(3) UL (ref 0–0.3)
EOSINOPHIL NFR BLD AUTO: 1.8 %
ERYTHROCYTE [DISTWIDTH] IN BLOOD BY AUTOMATED COUNT: 14 % (ref 11.5–16)
GLUCOSE BLD-MCNC: 94 MG/DL (ref 70–99)
HCT VFR BLD AUTO: 36.8 % (ref 34–50)
HGB BLD-MCNC: 11.7 G/DL (ref 12–16)
IMMATURE GRANULOCYTE COUNT: 0.01 X10(3) UL (ref 0–1)
IMMATURE GRANULOCYTE RATIO %: 0.2 %
LYMPHOCYTES # BLD AUTO: 1.76 X10(3) UL (ref 0.9–4)
LYMPHOCYTES NFR BLD AUTO: 38.9 %
M PROTEIN MFR SERPL ELPH: 6.3 G/DL (ref 6.1–8.3)
MCH RBC QN AUTO: 29 PG (ref 27–33.2)
MCHC RBC AUTO-ENTMCNC: 31.8 G/DL (ref 31–37)
MCV RBC AUTO: 91.1 FL (ref 81–100)
MONOCYTES # BLD AUTO: 0.29 X10(3) UL (ref 0.1–1)
MONOCYTES NFR BLD AUTO: 6.4 %
NEUTROPHIL ABS PRELIM: 2.34 X10 (3) UL (ref 1.3–6.7)
NEUTROPHILS # BLD AUTO: 2.34 X10(3) UL (ref 1.3–6.7)
NEUTROPHILS NFR BLD AUTO: 51.6 %
PLATELET # BLD AUTO: 246 10(3)UL (ref 150–450)
POTASSIUM SERPL-SCNC: 4.4 MMOL/L (ref 3.6–5.1)
RBC # BLD AUTO: 4.04 X10(6)UL (ref 3.8–5.1)
RED CELL DISTRIBUTION WIDTH-SD: 46.6 FL (ref 35.1–46.3)
SODIUM SERPL-SCNC: 140 MMOL/L (ref 136–144)
WBC # BLD AUTO: 4.5 X10(3) UL (ref 4–13)

## 2018-03-30 PROCEDURE — S0028 INJECTION, FAMOTIDINE, 20 MG: HCPCS | Performed by: INTERNAL MEDICINE

## 2018-03-30 PROCEDURE — 96417 CHEMO IV INFUS EACH ADDL SEQ: CPT

## 2018-03-30 PROCEDURE — 96413 CHEMO IV INFUSION 1 HR: CPT

## 2018-03-30 PROCEDURE — 85025 COMPLETE CBC W/AUTO DIFF WBC: CPT

## 2018-03-30 PROCEDURE — 96375 TX/PRO/DX INJ NEW DRUG ADDON: CPT

## 2018-03-30 PROCEDURE — 80053 COMPREHEN METABOLIC PANEL: CPT

## 2018-03-30 RX ORDER — DIPHENHYDRAMINE HYDROCHLORIDE 50 MG/ML
25 INJECTION INTRAMUSCULAR; INTRAVENOUS ONCE
Status: COMPLETED | OUTPATIENT
Start: 2018-03-30 | End: 2018-03-30

## 2018-03-30 RX ORDER — SODIUM CHLORIDE 0.9 % (FLUSH) 0.9 %
10 SYRINGE (ML) INJECTION ONCE
Status: CANCELLED | OUTPATIENT
Start: 2018-03-30

## 2018-03-30 RX ORDER — FAMOTIDINE 10 MG/ML
20 INJECTION, SOLUTION INTRAVENOUS ONCE
Status: COMPLETED | OUTPATIENT
Start: 2018-03-30 | End: 2018-03-30

## 2018-03-30 RX ORDER — SODIUM CHLORIDE 0.9 % (FLUSH) 0.9 %
10 SYRINGE (ML) INJECTION ONCE
Status: COMPLETED | OUTPATIENT
Start: 2018-03-30 | End: 2018-03-30

## 2018-03-30 RX ADMIN — FAMOTIDINE 20 MG: 10 INJECTION, SOLUTION INTRAVENOUS at 11:11:00

## 2018-03-30 RX ADMIN — DIPHENHYDRAMINE HYDROCHLORIDE 25 MG: 50 INJECTION INTRAMUSCULAR; INTRAVENOUS at 10:54:00

## 2018-03-30 RX ADMIN — SODIUM CHLORIDE 0.9 % (FLUSH) 10 ML: 0.9 % SYRINGE (ML) INJECTION at 13:45:00

## 2018-03-30 NOTE — PROGRESS NOTES
Education Record    Learner:  Patient    Disease / Clearnce Listen ca:    Barriers / Limitations:  None   Comments:    Method:  Reinforcement   Comments:    General Topics:  Side effects and symptom management and Plan of care reviewed   Comments:    Patricia Lugo

## 2018-03-31 ENCOUNTER — SNF/IP PROF CHARGE ONLY (OUTPATIENT)
Dept: HEMATOLOGY/ONCOLOGY | Facility: HOSPITAL | Age: 83
End: 2018-03-31

## 2018-03-31 DIAGNOSIS — Z17.0 CARCINOMA OF OVERLAPPING SITES OF LEFT BREAST IN FEMALE, ESTROGEN RECEPTOR POSITIVE (HCC): ICD-10-CM

## 2018-03-31 DIAGNOSIS — C50.812 CARCINOMA OF OVERLAPPING SITES OF LEFT BREAST IN FEMALE, ESTROGEN RECEPTOR POSITIVE (HCC): ICD-10-CM

## 2018-03-31 PROCEDURE — G9678 ONCOLOGY CARE MODEL SERVICE: HCPCS | Performed by: INTERNAL MEDICINE

## 2018-04-06 ENCOUNTER — OFFICE VISIT (OUTPATIENT)
Dept: HEMATOLOGY/ONCOLOGY | Age: 83
End: 2018-04-06
Attending: INTERNAL MEDICINE
Payer: MEDICARE

## 2018-04-06 VITALS
SYSTOLIC BLOOD PRESSURE: 155 MMHG | HEART RATE: 70 BPM | OXYGEN SATURATION: 99 % | DIASTOLIC BLOOD PRESSURE: 88 MMHG | BODY MASS INDEX: 26 KG/M2 | TEMPERATURE: 97 F | RESPIRATION RATE: 16 BRPM | WEIGHT: 152.38 LBS

## 2018-04-06 DIAGNOSIS — C50.812 CARCINOMA OF OVERLAPPING SITES OF LEFT BREAST IN FEMALE, ESTROGEN RECEPTOR POSITIVE (HCC): Primary | ICD-10-CM

## 2018-04-06 DIAGNOSIS — Z17.0 CARCINOMA OF OVERLAPPING SITES OF LEFT BREAST IN FEMALE, ESTROGEN RECEPTOR POSITIVE (HCC): Primary | ICD-10-CM

## 2018-04-06 PROCEDURE — 96417 CHEMO IV INFUS EACH ADDL SEQ: CPT

## 2018-04-06 PROCEDURE — 96375 TX/PRO/DX INJ NEW DRUG ADDON: CPT

## 2018-04-06 PROCEDURE — 85025 COMPLETE CBC W/AUTO DIFF WBC: CPT

## 2018-04-06 PROCEDURE — 96413 CHEMO IV INFUSION 1 HR: CPT

## 2018-04-06 PROCEDURE — S0028 INJECTION, FAMOTIDINE, 20 MG: HCPCS | Performed by: INTERNAL MEDICINE

## 2018-04-06 RX ORDER — SODIUM CHLORIDE 0.9 % (FLUSH) 0.9 %
10 SYRINGE (ML) INJECTION ONCE
Status: COMPLETED | OUTPATIENT
Start: 2018-04-06 | End: 2018-04-06

## 2018-04-06 RX ORDER — SODIUM CHLORIDE 0.9 % (FLUSH) 0.9 %
10 SYRINGE (ML) INJECTION ONCE
Status: CANCELLED | OUTPATIENT
Start: 2018-04-06

## 2018-04-06 RX ORDER — FAMOTIDINE 10 MG/ML
20 INJECTION, SOLUTION INTRAVENOUS ONCE
Status: COMPLETED | OUTPATIENT
Start: 2018-04-06 | End: 2018-04-06

## 2018-04-06 RX ORDER — DIPHENHYDRAMINE HYDROCHLORIDE 50 MG/ML
25 INJECTION INTRAMUSCULAR; INTRAVENOUS ONCE
Status: COMPLETED | OUTPATIENT
Start: 2018-04-06 | End: 2018-04-06

## 2018-04-06 RX ADMIN — FAMOTIDINE 20 MG: 10 INJECTION, SOLUTION INTRAVENOUS at 11:38:00

## 2018-04-06 RX ADMIN — SODIUM CHLORIDE 0.9 % (FLUSH) 10 ML: 0.9 % SYRINGE (ML) INJECTION at 13:37:00

## 2018-04-06 RX ADMIN — DIPHENHYDRAMINE HYDROCHLORIDE 25 MG: 50 INJECTION INTRAMUSCULAR; INTRAVENOUS at 11:28:00

## 2018-04-06 NOTE — PROGRESS NOTES
Education Record  Learner:  Patient and daughter  Disease / Diagnosis:   Breast cancer  Barriers / Limitations:  None  Method:  Printed material and Reinforcement  General Topics:  Plan of care reviewed; schedule; labs  Outcome:  Shows understanding and Pa

## 2018-04-13 ENCOUNTER — HOSPITAL ENCOUNTER (OUTPATIENT)
Dept: GENERAL RADIOLOGY | Age: 83
Discharge: HOME | End: 2018-04-13
Attending: INTERNAL MEDICINE
Payer: MEDICARE

## 2018-04-13 ENCOUNTER — OFFICE VISIT (OUTPATIENT)
Dept: HEMATOLOGY/ONCOLOGY | Age: 83
End: 2018-04-13
Attending: INTERNAL MEDICINE
Payer: MEDICARE

## 2018-04-13 VITALS
TEMPERATURE: 97 F | RESPIRATION RATE: 18 BRPM | WEIGHT: 151.31 LBS | SYSTOLIC BLOOD PRESSURE: 105 MMHG | OXYGEN SATURATION: 96 % | DIASTOLIC BLOOD PRESSURE: 66 MMHG | HEART RATE: 82 BPM | BODY MASS INDEX: 26 KG/M2

## 2018-04-13 DIAGNOSIS — Z17.0 CARCINOMA OF OVERLAPPING SITES OF LEFT BREAST IN FEMALE, ESTROGEN RECEPTOR POSITIVE (HCC): Primary | ICD-10-CM

## 2018-04-13 DIAGNOSIS — C50.812 CARCINOMA OF OVERLAPPING SITES OF LEFT BREAST IN FEMALE, ESTROGEN RECEPTOR POSITIVE (HCC): Primary | ICD-10-CM

## 2018-04-13 DIAGNOSIS — C50.812 CARCINOMA OF OVERLAPPING SITES OF LEFT BREAST IN FEMALE, ESTROGEN RECEPTOR POSITIVE (HCC): ICD-10-CM

## 2018-04-13 DIAGNOSIS — Z17.0 CARCINOMA OF OVERLAPPING SITES OF LEFT BREAST IN FEMALE, ESTROGEN RECEPTOR POSITIVE (HCC): ICD-10-CM

## 2018-04-13 PROCEDURE — 71046 X-RAY EXAM CHEST 2 VIEWS: CPT | Performed by: INTERNAL MEDICINE

## 2018-04-13 PROCEDURE — 85025 COMPLETE CBC W/AUTO DIFF WBC: CPT

## 2018-04-13 PROCEDURE — 96413 CHEMO IV INFUSION 1 HR: CPT

## 2018-04-13 PROCEDURE — S0028 INJECTION, FAMOTIDINE, 20 MG: HCPCS | Performed by: INTERNAL MEDICINE

## 2018-04-13 PROCEDURE — 99214 OFFICE O/P EST MOD 30 MIN: CPT | Performed by: INTERNAL MEDICINE

## 2018-04-13 RX ORDER — FAMOTIDINE 10 MG/ML
20 INJECTION, SOLUTION INTRAVENOUS ONCE
Status: DISCONTINUED | OUTPATIENT
Start: 2018-04-13 | End: 2018-04-13

## 2018-04-13 RX ORDER — LOPERAMIDE HYDROCHLORIDE 2 MG/1
2 CAPSULE ORAL 4 TIMES DAILY PRN
COMMUNITY
End: 2021-10-01

## 2018-04-13 RX ORDER — DIPHENHYDRAMINE HYDROCHLORIDE 50 MG/ML
25 INJECTION INTRAMUSCULAR; INTRAVENOUS ONCE
Status: DISCONTINUED | OUTPATIENT
Start: 2018-04-13 | End: 2018-04-13

## 2018-04-13 NOTE — PATIENT INSTRUCTIONS
For Dr. Samantha Romo nurse line, call [phone] with any questions or concerns,  Monday through Friday 8:00 to 4:30.      After hours or weekends for urgent needs: [phone].   Central [phone]  Medical Records:   [address]

## 2018-04-13 NOTE — PROGRESS NOTES
Cancer Center Progress Note    Problem List:      Patient Active Problem List:     Carcinoma of overlapping sites of left breast in female, estrogen receptor positive ([facility] [address]     Osteoporosis     Osteoarthrosis      Interim History:    She is on weekly pacli Restricted in physically strenuous activity but ambulatory and able to carry out work of a light or sedentary nature. Physical Examination:    General: Patient is alert and oriented x 3, not in acute distress. Neck: No palpable lymphadenopathy.  Neck i

## 2018-04-13 NOTE — PROGRESS NOTES
Education Record    Learner:  Patient    Disease / Rommel mott    Barriers / Limitations:  None   Comments:    Method:  Brief focused and Printed material   Comments:    General Topics:  Medication, Side effects and symptom management and Plan of c

## 2018-04-20 ENCOUNTER — OFFICE VISIT (OUTPATIENT)
Dept: HEMATOLOGY/ONCOLOGY | Age: 83
End: 2018-04-20
Attending: INTERNAL MEDICINE
Payer: MEDICARE

## 2018-04-20 VITALS
HEART RATE: 65 BPM | RESPIRATION RATE: 18 BRPM | BODY MASS INDEX: 26 KG/M2 | DIASTOLIC BLOOD PRESSURE: 71 MMHG | WEIGHT: 153.5 LBS | SYSTOLIC BLOOD PRESSURE: 144 MMHG | OXYGEN SATURATION: 99 % | TEMPERATURE: 97 F

## 2018-04-20 DIAGNOSIS — C50.812 CARCINOMA OF OVERLAPPING SITES OF LEFT BREAST IN FEMALE, ESTROGEN RECEPTOR POSITIVE (HCC): Primary | ICD-10-CM

## 2018-04-20 DIAGNOSIS — Z17.0 CARCINOMA OF OVERLAPPING SITES OF LEFT BREAST IN FEMALE, ESTROGEN RECEPTOR POSITIVE (HCC): Primary | ICD-10-CM

## 2018-04-20 PROCEDURE — 80053 COMPREHEN METABOLIC PANEL: CPT

## 2018-04-20 PROCEDURE — 96375 TX/PRO/DX INJ NEW DRUG ADDON: CPT

## 2018-04-20 PROCEDURE — 99214 OFFICE O/P EST MOD 30 MIN: CPT | Performed by: INTERNAL MEDICINE

## 2018-04-20 PROCEDURE — S0028 INJECTION, FAMOTIDINE, 20 MG: HCPCS | Performed by: INTERNAL MEDICINE

## 2018-04-20 PROCEDURE — 85025 COMPLETE CBC W/AUTO DIFF WBC: CPT

## 2018-04-20 PROCEDURE — 96417 CHEMO IV INFUS EACH ADDL SEQ: CPT

## 2018-04-20 PROCEDURE — 96413 CHEMO IV INFUSION 1 HR: CPT

## 2018-04-20 RX ORDER — PROCHLORPERAZINE MALEATE 10 MG
10 TABLET ORAL EVERY 6 HOURS PRN
Status: CANCELLED | OUTPATIENT
Start: 2018-04-27

## 2018-04-20 RX ORDER — FAMOTIDINE 10 MG/ML
20 INJECTION, SOLUTION INTRAVENOUS ONCE
Status: CANCELLED
Start: 2018-05-04 | End: 2018-05-04

## 2018-04-20 RX ORDER — PROCHLORPERAZINE MALEATE 10 MG
10 TABLET ORAL EVERY 6 HOURS PRN
Status: CANCELLED | OUTPATIENT
Start: 2018-05-04

## 2018-04-20 RX ORDER — LORAZEPAM 2 MG/ML
INJECTION INTRAMUSCULAR EVERY 4 HOURS PRN
Status: CANCELLED | OUTPATIENT
Start: 2018-05-04

## 2018-04-20 RX ORDER — ONDANSETRON 2 MG/ML
8 INJECTION INTRAMUSCULAR; INTRAVENOUS EVERY 6 HOURS PRN
Status: CANCELLED | OUTPATIENT
Start: 2018-05-04

## 2018-04-20 RX ORDER — DIPHENHYDRAMINE HYDROCHLORIDE 50 MG/ML
25 INJECTION INTRAMUSCULAR; INTRAVENOUS ONCE
Status: CANCELLED
Start: 2018-05-04 | End: 2018-05-04

## 2018-04-20 RX ORDER — FAMOTIDINE 10 MG/ML
20 INJECTION, SOLUTION INTRAVENOUS ONCE
Status: COMPLETED | OUTPATIENT
Start: 2018-04-20 | End: 2018-04-20

## 2018-04-20 RX ORDER — DIPHENHYDRAMINE HYDROCHLORIDE 50 MG/ML
25 INJECTION INTRAMUSCULAR; INTRAVENOUS ONCE
Status: CANCELLED
Start: 2018-04-20 | End: 2018-04-20

## 2018-04-20 RX ORDER — METOCLOPRAMIDE HYDROCHLORIDE 5 MG/ML
10 INJECTION INTRAMUSCULAR; INTRAVENOUS EVERY 6 HOURS PRN
Status: CANCELLED | OUTPATIENT
Start: 2018-05-04

## 2018-04-20 RX ORDER — LORAZEPAM 0.5 MG/1
TABLET ORAL EVERY 4 HOURS PRN
Status: CANCELLED | OUTPATIENT
Start: 2018-04-20

## 2018-04-20 RX ORDER — ONDANSETRON 2 MG/ML
8 INJECTION INTRAMUSCULAR; INTRAVENOUS EVERY 6 HOURS PRN
Status: CANCELLED | OUTPATIENT
Start: 2018-04-20

## 2018-04-20 RX ORDER — METOCLOPRAMIDE HYDROCHLORIDE 5 MG/ML
10 INJECTION INTRAMUSCULAR; INTRAVENOUS EVERY 6 HOURS PRN
Status: CANCELLED | OUTPATIENT
Start: 2018-04-20

## 2018-04-20 RX ORDER — DIPHENHYDRAMINE HYDROCHLORIDE 50 MG/ML
25 INJECTION INTRAMUSCULAR; INTRAVENOUS ONCE
Status: CANCELLED
Start: 2018-04-27 | End: 2018-04-27

## 2018-04-20 RX ORDER — FAMOTIDINE 10 MG/ML
20 INJECTION, SOLUTION INTRAVENOUS ONCE
Status: CANCELLED
Start: 2018-04-20 | End: 2018-04-20

## 2018-04-20 RX ORDER — PROCHLORPERAZINE MALEATE 10 MG
10 TABLET ORAL EVERY 6 HOURS PRN
Status: CANCELLED | OUTPATIENT
Start: 2018-04-20

## 2018-04-20 RX ORDER — METOCLOPRAMIDE HYDROCHLORIDE 5 MG/ML
10 INJECTION INTRAMUSCULAR; INTRAVENOUS EVERY 6 HOURS PRN
Status: CANCELLED | OUTPATIENT
Start: 2018-04-27

## 2018-04-20 RX ORDER — LORAZEPAM 0.5 MG/1
TABLET ORAL EVERY 4 HOURS PRN
Status: CANCELLED | OUTPATIENT
Start: 2018-04-27

## 2018-04-20 RX ORDER — LORAZEPAM 2 MG/ML
INJECTION INTRAMUSCULAR EVERY 4 HOURS PRN
Status: CANCELLED | OUTPATIENT
Start: 2018-04-27

## 2018-04-20 RX ORDER — SODIUM CHLORIDE 0.9 % (FLUSH) 0.9 %
10 SYRINGE (ML) INJECTION ONCE
Status: CANCELLED | OUTPATIENT
Start: 2018-04-20

## 2018-04-20 RX ORDER — SODIUM CHLORIDE 0.9 % (FLUSH) 0.9 %
10 SYRINGE (ML) INJECTION ONCE
Status: COMPLETED | OUTPATIENT
Start: 2018-04-20 | End: 2018-04-20

## 2018-04-20 RX ORDER — DIPHENHYDRAMINE HYDROCHLORIDE 50 MG/ML
25 INJECTION INTRAMUSCULAR; INTRAVENOUS ONCE
Status: COMPLETED | OUTPATIENT
Start: 2018-04-20 | End: 2018-04-20

## 2018-04-20 RX ORDER — LORAZEPAM 0.5 MG/1
TABLET ORAL EVERY 4 HOURS PRN
Status: CANCELLED | OUTPATIENT
Start: 2018-05-04

## 2018-04-20 RX ORDER — LORAZEPAM 2 MG/ML
INJECTION INTRAMUSCULAR EVERY 4 HOURS PRN
Status: CANCELLED | OUTPATIENT
Start: 2018-04-20

## 2018-04-20 RX ORDER — FAMOTIDINE 10 MG/ML
20 INJECTION, SOLUTION INTRAVENOUS ONCE
Status: CANCELLED
Start: 2018-04-27 | End: 2018-04-27

## 2018-04-20 RX ORDER — ONDANSETRON 2 MG/ML
8 INJECTION INTRAMUSCULAR; INTRAVENOUS EVERY 6 HOURS PRN
Status: CANCELLED | OUTPATIENT
Start: 2018-04-27

## 2018-04-20 RX ADMIN — FAMOTIDINE 20 MG: 10 INJECTION, SOLUTION INTRAVENOUS at 11:00:00

## 2018-04-20 RX ADMIN — DIPHENHYDRAMINE HYDROCHLORIDE 25 MG: 50 INJECTION INTRAMUSCULAR; INTRAVENOUS at 10:58:00

## 2018-04-20 RX ADMIN — SODIUM CHLORIDE 0.9 % (FLUSH) 10 ML: 0.9 % SYRINGE (ML) INJECTION at 13:45:00

## 2018-04-20 NOTE — PROGRESS NOTES
Cancer Center Progress Note    Problem List:      Patient Active Problem List:     Carcinoma of overlapping sites of left breast in female, estrogen receptor positive ([facility] [address]     Osteoporosis     Osteoarthrosis      Interim History:    She is on weekly pacli pain.  Hematologic/lymphatic: Negative for easy bruising, bleeding, and lymphadenopathy. Musculoskeletal: Negative for myalgias, arthralgias, muscle weakness.       Allergies:       Penicillins               Tetanus Toxoids             Medications:    Slava She has had five doses. We will resume with the triple therapy. I will go back to the paclitaxel 60 mg/m2 dosing with this cycle. She will use imodium as needed. We disucssed local care for her hemorrhoids. She will return in three weeks.     She is comfort

## 2018-04-27 ENCOUNTER — OFFICE VISIT (OUTPATIENT)
Dept: HEMATOLOGY/ONCOLOGY | Age: 83
End: 2018-04-27
Attending: INTERNAL MEDICINE
Payer: MEDICARE

## 2018-04-27 VITALS
OXYGEN SATURATION: 97 % | WEIGHT: 151.31 LBS | RESPIRATION RATE: 18 BRPM | SYSTOLIC BLOOD PRESSURE: 122 MMHG | DIASTOLIC BLOOD PRESSURE: 90 MMHG | TEMPERATURE: 98 F | HEART RATE: 77 BPM | BODY MASS INDEX: 26 KG/M2

## 2018-04-27 DIAGNOSIS — Z17.0 CARCINOMA OF OVERLAPPING SITES OF LEFT BREAST IN FEMALE, ESTROGEN RECEPTOR POSITIVE (HCC): Primary | ICD-10-CM

## 2018-04-27 DIAGNOSIS — C50.812 CARCINOMA OF OVERLAPPING SITES OF LEFT BREAST IN FEMALE, ESTROGEN RECEPTOR POSITIVE (HCC): Primary | ICD-10-CM

## 2018-04-27 PROCEDURE — 96417 CHEMO IV INFUS EACH ADDL SEQ: CPT

## 2018-04-27 PROCEDURE — 96413 CHEMO IV INFUSION 1 HR: CPT

## 2018-04-27 PROCEDURE — 85025 COMPLETE CBC W/AUTO DIFF WBC: CPT

## 2018-04-27 PROCEDURE — 96375 TX/PRO/DX INJ NEW DRUG ADDON: CPT

## 2018-04-27 PROCEDURE — S0028 INJECTION, FAMOTIDINE, 20 MG: HCPCS | Performed by: INTERNAL MEDICINE

## 2018-04-27 RX ORDER — DIPHENHYDRAMINE HYDROCHLORIDE 50 MG/ML
25 INJECTION INTRAMUSCULAR; INTRAVENOUS ONCE
Status: COMPLETED | OUTPATIENT
Start: 2018-04-27 | End: 2018-04-27

## 2018-04-27 RX ORDER — FAMOTIDINE 10 MG/ML
20 INJECTION, SOLUTION INTRAVENOUS ONCE
Status: COMPLETED | OUTPATIENT
Start: 2018-04-27 | End: 2018-04-27

## 2018-04-27 RX ADMIN — DIPHENHYDRAMINE HYDROCHLORIDE 25 MG: 50 INJECTION INTRAMUSCULAR; INTRAVENOUS at 11:10:00

## 2018-04-27 RX ADMIN — FAMOTIDINE 20 MG: 10 INJECTION, SOLUTION INTRAVENOUS at 11:17:00

## 2018-04-27 NOTE — PROGRESS NOTES
Education Record    Learner:  Patient    Disease / Mirella mott    Barriers / Limitations:  None   Comments:    Method:  Reinforcement   Comments:    General Topics:  Side effects and symptom management and Plan of care reviewed   Comments:    Patricia Lugo

## 2018-04-30 ENCOUNTER — SNF/IP PROF CHARGE ONLY (OUTPATIENT)
Dept: HEMATOLOGY/ONCOLOGY | Facility: HOSPITAL | Age: 83
End: 2018-04-30

## 2018-04-30 DIAGNOSIS — C50.812 CARCINOMA OF OVERLAPPING SITES OF LEFT BREAST IN FEMALE, ESTROGEN RECEPTOR POSITIVE (HCC): ICD-10-CM

## 2018-04-30 DIAGNOSIS — Z17.0 CARCINOMA OF OVERLAPPING SITES OF LEFT BREAST IN FEMALE, ESTROGEN RECEPTOR POSITIVE (HCC): ICD-10-CM

## 2018-04-30 PROCEDURE — G9678 ONCOLOGY CARE MODEL SERVICE: HCPCS | Performed by: INTERNAL MEDICINE

## 2018-05-04 ENCOUNTER — OFFICE VISIT (OUTPATIENT)
Dept: HEMATOLOGY/ONCOLOGY | Age: 83
End: 2018-05-04
Attending: INTERNAL MEDICINE
Payer: MEDICARE

## 2018-05-04 VITALS
SYSTOLIC BLOOD PRESSURE: 120 MMHG | RESPIRATION RATE: 16 BRPM | HEART RATE: 77 BPM | WEIGHT: 151 LBS | DIASTOLIC BLOOD PRESSURE: 86 MMHG | BODY MASS INDEX: 26 KG/M2

## 2018-05-04 DIAGNOSIS — C50.812 CARCINOMA OF OVERLAPPING SITES OF LEFT BREAST IN FEMALE, ESTROGEN RECEPTOR POSITIVE (HCC): Primary | ICD-10-CM

## 2018-05-04 DIAGNOSIS — Z17.0 CARCINOMA OF OVERLAPPING SITES OF LEFT BREAST IN FEMALE, ESTROGEN RECEPTOR POSITIVE (HCC): Primary | ICD-10-CM

## 2018-05-04 PROCEDURE — 85025 COMPLETE CBC W/AUTO DIFF WBC: CPT

## 2018-05-04 PROCEDURE — 96375 TX/PRO/DX INJ NEW DRUG ADDON: CPT

## 2018-05-04 PROCEDURE — 96413 CHEMO IV INFUSION 1 HR: CPT

## 2018-05-04 PROCEDURE — S0028 INJECTION, FAMOTIDINE, 20 MG: HCPCS | Performed by: INTERNAL MEDICINE

## 2018-05-04 PROCEDURE — 96417 CHEMO IV INFUS EACH ADDL SEQ: CPT

## 2018-05-04 RX ORDER — FAMOTIDINE 10 MG/ML
20 INJECTION, SOLUTION INTRAVENOUS ONCE
Status: COMPLETED | OUTPATIENT
Start: 2018-05-04 | End: 2018-05-04

## 2018-05-04 RX ORDER — DIPHENHYDRAMINE HYDROCHLORIDE 50 MG/ML
25 INJECTION INTRAMUSCULAR; INTRAVENOUS ONCE
Status: COMPLETED | OUTPATIENT
Start: 2018-05-04 | End: 2018-05-04

## 2018-05-04 RX ADMIN — FAMOTIDINE 20 MG: 10 INJECTION, SOLUTION INTRAVENOUS at 10:35:00

## 2018-05-04 RX ADMIN — DIPHENHYDRAMINE HYDROCHLORIDE 25 MG: 50 INJECTION INTRAMUSCULAR; INTRAVENOUS at 10:33:00

## 2018-05-04 NOTE — PATIENT INSTRUCTIONS
To reach Dr Mary Chin nurse during business hours, please call [phone]. After hours, including weekends, evenings, and holidays, please call the main number [phone] for emergent needs.

## 2018-05-04 NOTE — PROGRESS NOTES
Education Record    Learner:  Patient    Disease / Shawnee Pack cancer    Barriers / Limitations:  None    Method:  Brief focused, printed material and  reinforcement    General Topics:  Plan of care reviewed    Outcome:  Shows understanding

## 2018-05-11 ENCOUNTER — OFFICE VISIT (OUTPATIENT)
Dept: HEMATOLOGY/ONCOLOGY | Age: 83
End: 2018-05-11
Attending: INTERNAL MEDICINE
Payer: MEDICARE

## 2018-05-11 VITALS
RESPIRATION RATE: 18 BRPM | WEIGHT: 150.38 LBS | BODY MASS INDEX: 26 KG/M2 | SYSTOLIC BLOOD PRESSURE: 160 MMHG | HEART RATE: 70 BPM | OXYGEN SATURATION: 99 % | TEMPERATURE: 98 F | DIASTOLIC BLOOD PRESSURE: 80 MMHG

## 2018-05-11 DIAGNOSIS — Z17.0 CARCINOMA OF OVERLAPPING SITES OF LEFT BREAST IN FEMALE, ESTROGEN RECEPTOR POSITIVE (HCC): Primary | ICD-10-CM

## 2018-05-11 DIAGNOSIS — C50.812 CARCINOMA OF OVERLAPPING SITES OF LEFT BREAST IN FEMALE, ESTROGEN RECEPTOR POSITIVE (HCC): Primary | ICD-10-CM

## 2018-05-11 DIAGNOSIS — R60.0 LOCALIZED EDEMA: ICD-10-CM

## 2018-05-11 PROCEDURE — S0028 INJECTION, FAMOTIDINE, 20 MG: HCPCS | Performed by: INTERNAL MEDICINE

## 2018-05-11 PROCEDURE — 99214 OFFICE O/P EST MOD 30 MIN: CPT | Performed by: INTERNAL MEDICINE

## 2018-05-11 PROCEDURE — 85025 COMPLETE CBC W/AUTO DIFF WBC: CPT

## 2018-05-11 PROCEDURE — 96413 CHEMO IV INFUSION 1 HR: CPT

## 2018-05-11 PROCEDURE — 96375 TX/PRO/DX INJ NEW DRUG ADDON: CPT

## 2018-05-11 PROCEDURE — 96417 CHEMO IV INFUS EACH ADDL SEQ: CPT

## 2018-05-11 PROCEDURE — 80053 COMPREHEN METABOLIC PANEL: CPT

## 2018-05-11 RX ORDER — ONDANSETRON 2 MG/ML
8 INJECTION INTRAMUSCULAR; INTRAVENOUS EVERY 6 HOURS PRN
Status: CANCELLED | OUTPATIENT
Start: 2018-05-25

## 2018-05-11 RX ORDER — LORAZEPAM 0.5 MG/1
TABLET ORAL EVERY 4 HOURS PRN
Status: CANCELLED | OUTPATIENT
Start: 2018-05-11

## 2018-05-11 RX ORDER — DIPHENHYDRAMINE HYDROCHLORIDE 50 MG/ML
25 INJECTION INTRAMUSCULAR; INTRAVENOUS ONCE
Status: COMPLETED | OUTPATIENT
Start: 2018-05-11 | End: 2018-05-11

## 2018-05-11 RX ORDER — LORAZEPAM 2 MG/ML
INJECTION INTRAMUSCULAR EVERY 4 HOURS PRN
Status: CANCELLED | OUTPATIENT
Start: 2018-05-11

## 2018-05-11 RX ORDER — FAMOTIDINE 10 MG/ML
20 INJECTION, SOLUTION INTRAVENOUS ONCE
Status: COMPLETED | OUTPATIENT
Start: 2018-05-11 | End: 2018-05-11

## 2018-05-11 RX ORDER — METOCLOPRAMIDE HYDROCHLORIDE 5 MG/ML
10 INJECTION INTRAMUSCULAR; INTRAVENOUS EVERY 6 HOURS PRN
Status: CANCELLED | OUTPATIENT
Start: 2018-05-11

## 2018-05-11 RX ORDER — ONDANSETRON 2 MG/ML
8 INJECTION INTRAMUSCULAR; INTRAVENOUS EVERY 6 HOURS PRN
Status: CANCELLED | OUTPATIENT
Start: 2018-05-18

## 2018-05-11 RX ORDER — LORAZEPAM 0.5 MG/1
TABLET ORAL EVERY 4 HOURS PRN
Status: CANCELLED | OUTPATIENT
Start: 2018-05-25

## 2018-05-11 RX ORDER — LORAZEPAM 0.5 MG/1
TABLET ORAL EVERY 4 HOURS PRN
Status: CANCELLED | OUTPATIENT
Start: 2018-05-18

## 2018-05-11 RX ORDER — PROCHLORPERAZINE MALEATE 10 MG
10 TABLET ORAL EVERY 6 HOURS PRN
Status: CANCELLED | OUTPATIENT
Start: 2018-05-25

## 2018-05-11 RX ORDER — METOCLOPRAMIDE HYDROCHLORIDE 5 MG/ML
10 INJECTION INTRAMUSCULAR; INTRAVENOUS EVERY 6 HOURS PRN
Status: CANCELLED | OUTPATIENT
Start: 2018-05-25

## 2018-05-11 RX ORDER — DIPHENHYDRAMINE HYDROCHLORIDE 50 MG/ML
25 INJECTION INTRAMUSCULAR; INTRAVENOUS ONCE
Status: CANCELLED
Start: 2018-05-25 | End: 2018-05-25

## 2018-05-11 RX ORDER — DIPHENHYDRAMINE HYDROCHLORIDE 50 MG/ML
25 INJECTION INTRAMUSCULAR; INTRAVENOUS ONCE
Status: CANCELLED
Start: 2018-05-18 | End: 2018-05-18

## 2018-05-11 RX ORDER — PROCHLORPERAZINE MALEATE 10 MG
10 TABLET ORAL EVERY 6 HOURS PRN
Status: CANCELLED | OUTPATIENT
Start: 2018-05-11

## 2018-05-11 RX ORDER — LORAZEPAM 2 MG/ML
INJECTION INTRAMUSCULAR EVERY 4 HOURS PRN
Status: CANCELLED | OUTPATIENT
Start: 2018-05-18

## 2018-05-11 RX ORDER — DIPHENHYDRAMINE HYDROCHLORIDE 50 MG/ML
25 INJECTION INTRAMUSCULAR; INTRAVENOUS ONCE
Status: CANCELLED
Start: 2018-05-11 | End: 2018-05-11

## 2018-05-11 RX ORDER — FAMOTIDINE 10 MG/ML
20 INJECTION, SOLUTION INTRAVENOUS ONCE
Status: CANCELLED
Start: 2018-05-25 | End: 2018-05-25

## 2018-05-11 RX ORDER — PROCHLORPERAZINE MALEATE 10 MG
10 TABLET ORAL EVERY 6 HOURS PRN
Status: CANCELLED | OUTPATIENT
Start: 2018-05-18

## 2018-05-11 RX ORDER — ONDANSETRON 2 MG/ML
8 INJECTION INTRAMUSCULAR; INTRAVENOUS EVERY 6 HOURS PRN
Status: CANCELLED | OUTPATIENT
Start: 2018-05-11

## 2018-05-11 RX ORDER — FAMOTIDINE 10 MG/ML
20 INJECTION, SOLUTION INTRAVENOUS ONCE
Status: CANCELLED
Start: 2018-05-11 | End: 2018-05-11

## 2018-05-11 RX ORDER — LORAZEPAM 2 MG/ML
INJECTION INTRAMUSCULAR EVERY 4 HOURS PRN
Status: CANCELLED | OUTPATIENT
Start: 2018-05-25

## 2018-05-11 RX ORDER — FAMOTIDINE 10 MG/ML
20 INJECTION, SOLUTION INTRAVENOUS ONCE
Status: CANCELLED
Start: 2018-05-18 | End: 2018-05-18

## 2018-05-11 RX ORDER — METOCLOPRAMIDE HYDROCHLORIDE 5 MG/ML
10 INJECTION INTRAMUSCULAR; INTRAVENOUS EVERY 6 HOURS PRN
Status: CANCELLED | OUTPATIENT
Start: 2018-05-18

## 2018-05-11 RX ADMIN — FAMOTIDINE 20 MG: 10 INJECTION, SOLUTION INTRAVENOUS at 10:15:00

## 2018-05-11 RX ADMIN — DIPHENHYDRAMINE HYDROCHLORIDE 25 MG: 50 INJECTION INTRAMUSCULAR; INTRAVENOUS at 10:58:00

## 2018-05-11 NOTE — PROGRESS NOTES
Education Record    Learner:  Patient    Disease / Mildred Spray ca    Barriers / Limitations:  None   Comments:    Method:  Brief focused and Printed material   Comments:    General Topics:  Medication, Side effects and symptom management and Plan of c

## 2018-05-11 NOTE — PATIENT INSTRUCTIONS
For Dr. Savita Santamaria nurse line, call [phone] with any questions or concerns,  Monday through Friday 8:00 to 4:30.      After hours or weekends for urgent needs: [phone].   Central [phone]  Medical Records:   [address]

## 2018-05-11 NOTE — PROGRESS NOTES
Cancer Center Progress Note    Problem List:      Patient Active Problem List:     Carcinoma of overlapping sites of left breast in female, estrogen receptor positive ([facility] [address]     Osteoporosis     Osteoarthrosis      Interim History:    She is on weekly pacli myalgias, arthralgias, muscle weakness. Allergies:       Penicillins               Tetanus Toxoids             Medications:    Loperamide HCl 2 MG Oral Cap Take 2 mg by mouth 4 (four) times daily as needed for Diarrhea.  Disp:  Rfl:          Vital Sign She asked about getting a repeat PET scan. I spent time discussing the issue of scanning. I recommended that we get this based on symptoms. She and her son had questions about this.  This visit lasted 30 min with greater than 50% for discussion of managemen

## 2018-05-18 ENCOUNTER — OFFICE VISIT (OUTPATIENT)
Dept: HEMATOLOGY/ONCOLOGY | Age: 83
End: 2018-05-18
Attending: INTERNAL MEDICINE
Payer: MEDICARE

## 2018-05-18 VITALS
RESPIRATION RATE: 18 BRPM | HEART RATE: 70 BPM | BODY MASS INDEX: 26 KG/M2 | SYSTOLIC BLOOD PRESSURE: 162 MMHG | OXYGEN SATURATION: 98 % | DIASTOLIC BLOOD PRESSURE: 78 MMHG | WEIGHT: 149.13 LBS | TEMPERATURE: 99 F

## 2018-05-18 DIAGNOSIS — C50.812 CARCINOMA OF OVERLAPPING SITES OF LEFT BREAST IN FEMALE, ESTROGEN RECEPTOR POSITIVE (HCC): Primary | ICD-10-CM

## 2018-05-18 DIAGNOSIS — Z17.0 CARCINOMA OF OVERLAPPING SITES OF LEFT BREAST IN FEMALE, ESTROGEN RECEPTOR POSITIVE (HCC): Primary | ICD-10-CM

## 2018-05-18 PROCEDURE — 96413 CHEMO IV INFUSION 1 HR: CPT

## 2018-05-18 PROCEDURE — 96375 TX/PRO/DX INJ NEW DRUG ADDON: CPT

## 2018-05-18 PROCEDURE — S0028 INJECTION, FAMOTIDINE, 20 MG: HCPCS | Performed by: INTERNAL MEDICINE

## 2018-05-18 PROCEDURE — 85025 COMPLETE CBC W/AUTO DIFF WBC: CPT

## 2018-05-18 PROCEDURE — 96417 CHEMO IV INFUS EACH ADDL SEQ: CPT

## 2018-05-18 RX ORDER — DIPHENHYDRAMINE HYDROCHLORIDE 50 MG/ML
25 INJECTION INTRAMUSCULAR; INTRAVENOUS ONCE
Status: COMPLETED | OUTPATIENT
Start: 2018-05-18 | End: 2018-05-18

## 2018-05-18 RX ORDER — FAMOTIDINE 10 MG/ML
20 INJECTION, SOLUTION INTRAVENOUS ONCE
Status: COMPLETED | OUTPATIENT
Start: 2018-05-18 | End: 2018-05-18

## 2018-05-18 RX ADMIN — FAMOTIDINE 20 MG: 10 INJECTION, SOLUTION INTRAVENOUS at 11:34:00

## 2018-05-18 RX ADMIN — DIPHENHYDRAMINE HYDROCHLORIDE 25 MG: 50 INJECTION INTRAMUSCULAR; INTRAVENOUS at 11:33:00

## 2018-05-18 NOTE — PROGRESS NOTES
Education Record    Learner:  Patient    Disease / Zach Lanes cancer    Barriers / Limitations:  None    Method:  Brief focused, printed material and  reinforcement    General Topics:  Plan of care reviewed    Outcome:  Shows understanding

## 2018-05-18 NOTE — PATIENT INSTRUCTIONS
To reach Dr Zaida Lim nurse during business hours, please call [phone]. After hours, including weekends, evenings, and holidays, please call the main number [phone] for emergent needs.

## 2018-05-21 ENCOUNTER — HOSPITAL ENCOUNTER (OUTPATIENT)
Dept: CV DIAGNOSTICS | Facility: HOSPITAL | Age: 83
Discharge: HOME | End: 2018-05-21
Attending: INTERNAL MEDICINE
Payer: MEDICARE

## 2018-05-21 DIAGNOSIS — Z17.0 CARCINOMA OF OVERLAPPING SITES OF LEFT BREAST IN FEMALE, ESTROGEN RECEPTOR POSITIVE (HCC): ICD-10-CM

## 2018-05-21 DIAGNOSIS — C50.812 CARCINOMA OF OVERLAPPING SITES OF LEFT BREAST IN FEMALE, ESTROGEN RECEPTOR POSITIVE (HCC): ICD-10-CM

## 2018-05-21 DIAGNOSIS — R60.0 LOCALIZED EDEMA: ICD-10-CM

## 2018-05-21 PROCEDURE — 93306 TTE W/DOPPLER COMPLETE: CPT | Performed by: INTERNAL MEDICINE

## 2018-05-25 ENCOUNTER — OFFICE VISIT (OUTPATIENT)
Dept: HEMATOLOGY/ONCOLOGY | Age: 83
End: 2018-05-25
Attending: INTERNAL MEDICINE
Payer: MEDICARE

## 2018-05-25 VITALS
RESPIRATION RATE: 18 BRPM | SYSTOLIC BLOOD PRESSURE: 148 MMHG | TEMPERATURE: 97 F | HEART RATE: 59 BPM | DIASTOLIC BLOOD PRESSURE: 78 MMHG | OXYGEN SATURATION: 99 % | BODY MASS INDEX: 26 KG/M2 | WEIGHT: 148.69 LBS

## 2018-05-25 DIAGNOSIS — Z17.0 CARCINOMA OF OVERLAPPING SITES OF LEFT BREAST IN FEMALE, ESTROGEN RECEPTOR POSITIVE (HCC): Primary | ICD-10-CM

## 2018-05-25 DIAGNOSIS — C50.812 CARCINOMA OF OVERLAPPING SITES OF LEFT BREAST IN FEMALE, ESTROGEN RECEPTOR POSITIVE (HCC): Primary | ICD-10-CM

## 2018-05-25 PROCEDURE — 96375 TX/PRO/DX INJ NEW DRUG ADDON: CPT

## 2018-05-25 PROCEDURE — 85025 COMPLETE CBC W/AUTO DIFF WBC: CPT

## 2018-05-25 PROCEDURE — S0028 INJECTION, FAMOTIDINE, 20 MG: HCPCS | Performed by: INTERNAL MEDICINE

## 2018-05-25 PROCEDURE — 96413 CHEMO IV INFUSION 1 HR: CPT

## 2018-05-25 PROCEDURE — 96417 CHEMO IV INFUS EACH ADDL SEQ: CPT

## 2018-05-25 RX ORDER — FAMOTIDINE 10 MG/ML
20 INJECTION, SOLUTION INTRAVENOUS ONCE
Status: COMPLETED | OUTPATIENT
Start: 2018-05-25 | End: 2018-05-25

## 2018-05-25 RX ORDER — DIPHENHYDRAMINE HYDROCHLORIDE 50 MG/ML
25 INJECTION INTRAMUSCULAR; INTRAVENOUS ONCE
Status: COMPLETED | OUTPATIENT
Start: 2018-05-25 | End: 2018-05-25

## 2018-05-25 RX ADMIN — DIPHENHYDRAMINE HYDROCHLORIDE 25 MG: 50 INJECTION INTRAMUSCULAR; INTRAVENOUS at 10:38:00

## 2018-05-25 RX ADMIN — FAMOTIDINE 20 MG: 10 INJECTION, SOLUTION INTRAVENOUS at 10:40:00

## 2018-05-25 NOTE — PROGRESS NOTES
Pt here for C4D15 Taxol/Herceptin.   Arrives Ambulating independently, accompanied by Family member           Modifications in dose or schedule: No     Frequency of blood return and site check throughout administration: Prior to administration and At comple

## 2018-05-31 ENCOUNTER — SNF/IP PROF CHARGE ONLY (OUTPATIENT)
Dept: HEMATOLOGY/ONCOLOGY | Facility: HOSPITAL | Age: 83
End: 2018-05-31

## 2018-05-31 DIAGNOSIS — C50.812 CARCINOMA OF OVERLAPPING SITES OF LEFT BREAST IN FEMALE, ESTROGEN RECEPTOR POSITIVE (HCC): ICD-10-CM

## 2018-05-31 DIAGNOSIS — Z17.0 CARCINOMA OF OVERLAPPING SITES OF LEFT BREAST IN FEMALE, ESTROGEN RECEPTOR POSITIVE (HCC): ICD-10-CM

## 2018-05-31 PROCEDURE — G9678 ONCOLOGY CARE MODEL SERVICE: HCPCS | Performed by: INTERNAL MEDICINE

## 2018-06-01 ENCOUNTER — OFFICE VISIT (OUTPATIENT)
Dept: HEMATOLOGY/ONCOLOGY | Age: 83
End: 2018-06-01
Attending: INTERNAL MEDICINE
Payer: MEDICARE

## 2018-06-01 VITALS
DIASTOLIC BLOOD PRESSURE: 89 MMHG | SYSTOLIC BLOOD PRESSURE: 156 MMHG | HEART RATE: 68 BPM | TEMPERATURE: 97 F | WEIGHT: 149.63 LBS | OXYGEN SATURATION: 99 % | RESPIRATION RATE: 18 BRPM | BODY MASS INDEX: 26 KG/M2

## 2018-06-01 DIAGNOSIS — Z17.0 CARCINOMA OF OVERLAPPING SITES OF LEFT BREAST IN FEMALE, ESTROGEN RECEPTOR POSITIVE (HCC): Primary | ICD-10-CM

## 2018-06-01 DIAGNOSIS — Z79.899 ENCOUNTER FOR MONITORING CARDIOTOXIC DRUG THERAPY: ICD-10-CM

## 2018-06-01 DIAGNOSIS — C50.812 CARCINOMA OF OVERLAPPING SITES OF LEFT BREAST IN FEMALE, ESTROGEN RECEPTOR POSITIVE (HCC): Primary | ICD-10-CM

## 2018-06-01 DIAGNOSIS — Z51.81 ENCOUNTER FOR MONITORING CARDIOTOXIC DRUG THERAPY: ICD-10-CM

## 2018-06-01 PROCEDURE — 99214 OFFICE O/P EST MOD 30 MIN: CPT | Performed by: INTERNAL MEDICINE

## 2018-06-01 PROCEDURE — 96375 TX/PRO/DX INJ NEW DRUG ADDON: CPT

## 2018-06-01 PROCEDURE — 85025 COMPLETE CBC W/AUTO DIFF WBC: CPT

## 2018-06-01 PROCEDURE — 80053 COMPREHEN METABOLIC PANEL: CPT

## 2018-06-01 PROCEDURE — 96413 CHEMO IV INFUSION 1 HR: CPT

## 2018-06-01 RX ORDER — DIPHENHYDRAMINE HYDROCHLORIDE 50 MG/ML
25 INJECTION INTRAMUSCULAR; INTRAVENOUS ONCE
Status: COMPLETED | OUTPATIENT
Start: 2018-06-01 | End: 2018-06-01

## 2018-06-01 RX ORDER — LORAZEPAM 2 MG/ML
INJECTION INTRAMUSCULAR EVERY 4 HOURS PRN
Status: CANCELLED | OUTPATIENT
Start: 2018-06-01

## 2018-06-01 RX ORDER — FAMOTIDINE 10 MG/ML
20 INJECTION, SOLUTION INTRAVENOUS ONCE
Status: COMPLETED | OUTPATIENT
Start: 2018-06-01 | End: 2018-06-01

## 2018-06-01 RX ORDER — METOCLOPRAMIDE HYDROCHLORIDE 5 MG/ML
10 INJECTION INTRAMUSCULAR; INTRAVENOUS EVERY 6 HOURS PRN
Status: CANCELLED | OUTPATIENT
Start: 2018-06-01

## 2018-06-01 RX ORDER — ONDANSETRON 2 MG/ML
8 INJECTION INTRAMUSCULAR; INTRAVENOUS EVERY 6 HOURS PRN
Status: CANCELLED | OUTPATIENT
Start: 2018-06-01

## 2018-06-01 RX ORDER — LORAZEPAM 0.5 MG/1
TABLET ORAL EVERY 4 HOURS PRN
Status: CANCELLED | OUTPATIENT
Start: 2018-06-01

## 2018-06-01 RX ORDER — PROCHLORPERAZINE MALEATE 10 MG
10 TABLET ORAL EVERY 6 HOURS PRN
Status: CANCELLED | OUTPATIENT
Start: 2018-06-01

## 2018-06-01 RX ADMIN — FAMOTIDINE 20 MG: 10 INJECTION, SOLUTION INTRAVENOUS at 12:12:00

## 2018-06-01 RX ADMIN — DIPHENHYDRAMINE HYDROCHLORIDE 25 MG: 50 INJECTION INTRAMUSCULAR; INTRAVENOUS at 12:28:00

## 2018-06-01 NOTE — PROGRESS NOTES
Education Record    Learner:  Patient    Disease / Stacey mott    Barriers / Limitations:  None   Comments:    Method:  Brief focused and Printed material   Comments:    General Topics:  Medication, Side effects and symptom management and Plan of c

## 2018-06-01 NOTE — PROGRESS NOTES
Cancer Center Progress Note    Problem List:      Patient Active Problem List:     Carcinoma of overlapping sites of left breast in female, estrogen receptor positive ([facility] [address]     Osteoporosis     Osteoarthrosis      Interim History:    She is on weekly pacli a light or sedentary nature. Physical Examination:    General: Patient is alert and oriented x 3, not in acute distress. Neck: No palpable lymphadenopathy. Neck is supple. Chest: Clear to auscultation. No rales. No wheezes.   Heart: Regular rate and

## 2018-06-25 ENCOUNTER — HOSPITAL ENCOUNTER (OUTPATIENT)
Dept: CV DIAGNOSTICS | Facility: HOSPITAL | Age: 83
Discharge: HOME | End: 2018-06-25
Attending: INTERNAL MEDICINE
Payer: MEDICARE

## 2018-06-25 DIAGNOSIS — Z17.0 CARCINOMA OF OVERLAPPING SITES OF LEFT BREAST IN FEMALE, ESTROGEN RECEPTOR POSITIVE (HCC): ICD-10-CM

## 2018-06-25 DIAGNOSIS — Z79.899 ENCOUNTER FOR MONITORING CARDIOTOXIC DRUG THERAPY: ICD-10-CM

## 2018-06-25 DIAGNOSIS — C50.812 CARCINOMA OF OVERLAPPING SITES OF LEFT BREAST IN FEMALE, ESTROGEN RECEPTOR POSITIVE (HCC): ICD-10-CM

## 2018-06-25 DIAGNOSIS — Z51.81 ENCOUNTER FOR MONITORING CARDIOTOXIC DRUG THERAPY: ICD-10-CM

## 2018-06-25 PROCEDURE — 93306 TTE W/DOPPLER COMPLETE: CPT | Performed by: INTERNAL MEDICINE

## 2018-06-29 ENCOUNTER — OFFICE VISIT (OUTPATIENT)
Dept: HEMATOLOGY/ONCOLOGY | Age: 83
End: 2018-06-29
Attending: INTERNAL MEDICINE
Payer: MEDICARE

## 2018-06-29 VITALS
BODY MASS INDEX: 26 KG/M2 | RESPIRATION RATE: 16 BRPM | HEART RATE: 65 BPM | OXYGEN SATURATION: 99 % | DIASTOLIC BLOOD PRESSURE: 82 MMHG | WEIGHT: 149.38 LBS | TEMPERATURE: 98 F | SYSTOLIC BLOOD PRESSURE: 163 MMHG

## 2018-06-29 DIAGNOSIS — C50.812 CARCINOMA OF OVERLAPPING SITES OF LEFT BREAST IN FEMALE, ESTROGEN RECEPTOR POSITIVE (HCC): Primary | ICD-10-CM

## 2018-06-29 DIAGNOSIS — Z17.0 CARCINOMA OF OVERLAPPING SITES OF LEFT BREAST IN FEMALE, ESTROGEN RECEPTOR POSITIVE (HCC): Primary | ICD-10-CM

## 2018-06-29 PROCEDURE — 80053 COMPREHEN METABOLIC PANEL: CPT

## 2018-06-29 PROCEDURE — 96413 CHEMO IV INFUSION 1 HR: CPT

## 2018-06-29 PROCEDURE — 99214 OFFICE O/P EST MOD 30 MIN: CPT | Performed by: INTERNAL MEDICINE

## 2018-06-29 PROCEDURE — 96417 CHEMO IV INFUS EACH ADDL SEQ: CPT

## 2018-06-29 PROCEDURE — 85025 COMPLETE CBC W/AUTO DIFF WBC: CPT

## 2018-06-29 RX ORDER — SODIUM CHLORIDE 0.9 % (FLUSH) 0.9 %
10 SYRINGE (ML) INJECTION ONCE
Status: CANCELLED | OUTPATIENT
Start: 2018-06-29

## 2018-06-29 RX ORDER — SODIUM CHLORIDE 0.9 % (FLUSH) 0.9 %
10 SYRINGE (ML) INJECTION ONCE
Status: COMPLETED | OUTPATIENT
Start: 2018-06-29 | End: 2018-06-29

## 2018-06-29 RX ADMIN — SODIUM CHLORIDE 0.9 % (FLUSH) 10 ML: 0.9 % SYRINGE (ML) INJECTION at 13:42:00

## 2018-06-29 NOTE — PROGRESS NOTES
Pt here for C6D1.   Arrives Ambulating independently, accompanied by Family member           Modifications in dose or schedule: No     Frequency of blood return and site check throughout administration: Prior to administration   Discharged to Home, [address]

## 2018-06-29 NOTE — PROGRESS NOTES
Outpatient Oncology Care Plan  Problem list:  knowledge deficit    Problems related to:    chemotherapy    Interventions:  provided general teaching    Expected outcomes:  symptoms relieved/minimized    Progress towards outcome:  making progress    [place]

## 2018-06-29 NOTE — PROGRESS NOTES
Cancer Center Progress Note    Problem List:      Patient Active Problem List:     Carcinoma of overlapping sites of left breast in female, estrogen receptor positive ([facility] [address]     Osteoporosis     Osteoarthrosis     Encounter for monitoring cardiotoxic drug t Examination:    General: Patient is alert and oriented x 3, not in acute distress. Neck: No palpable lymphadenopathy. Neck is supple. Chest: Clear to auscultation. No rales. No wheezes. Heart: Regular rate and rhythm.    Abdomen: Soft, non tender with g

## 2018-06-30 ENCOUNTER — SNF/IP PROF CHARGE ONLY (OUTPATIENT)
Dept: HEMATOLOGY/ONCOLOGY | Facility: HOSPITAL | Age: 83
End: 2018-06-30

## 2018-06-30 DIAGNOSIS — C50.812 CARCINOMA OF OVERLAPPING SITES OF LEFT BREAST IN FEMALE, ESTROGEN RECEPTOR POSITIVE (HCC): ICD-10-CM

## 2018-06-30 DIAGNOSIS — Z17.0 CARCINOMA OF OVERLAPPING SITES OF LEFT BREAST IN FEMALE, ESTROGEN RECEPTOR POSITIVE (HCC): ICD-10-CM

## 2018-06-30 PROCEDURE — G9678 ONCOLOGY CARE MODEL SERVICE: HCPCS | Performed by: INTERNAL MEDICINE

## 2018-07-01 ENCOUNTER — HOSPITAL ENCOUNTER (OUTPATIENT)
Dept: RADIATION ONCOLOGY | Age: 83
Discharge: HOME | End: 2018-07-01
Attending: RADIOLOGY
Payer: MEDICARE

## 2018-07-20 ENCOUNTER — OFFICE VISIT (OUTPATIENT)
Dept: HEMATOLOGY/ONCOLOGY | Age: 83
End: 2018-07-20
Attending: INTERNAL MEDICINE
Payer: MEDICARE

## 2018-07-20 VITALS
WEIGHT: 146.63 LBS | OXYGEN SATURATION: 97 % | BODY MASS INDEX: 25 KG/M2 | RESPIRATION RATE: 16 BRPM | HEART RATE: 68 BPM | SYSTOLIC BLOOD PRESSURE: 125 MMHG | TEMPERATURE: 98 F | DIASTOLIC BLOOD PRESSURE: 77 MMHG

## 2018-07-20 DIAGNOSIS — Z17.0 CARCINOMA OF OVERLAPPING SITES OF LEFT BREAST IN FEMALE, ESTROGEN RECEPTOR POSITIVE (HCC): Primary | ICD-10-CM

## 2018-07-20 DIAGNOSIS — C50.812 CARCINOMA OF OVERLAPPING SITES OF LEFT BREAST IN FEMALE, ESTROGEN RECEPTOR POSITIVE (HCC): Primary | ICD-10-CM

## 2018-07-20 DIAGNOSIS — Z78.0 ASYMPTOMATIC MENOPAUSE: ICD-10-CM

## 2018-07-20 LAB
ALBUMIN SERPL-MCNC: 3.5 G/DL (ref 3.5–4.8)
ALBUMIN/GLOB SERPL: 1.1 {RATIO} (ref 1–2)
ALP LIVER SERPL-CCNC: 57 U/L (ref 55–142)
ALT SERPL-CCNC: 21 U/L (ref 14–54)
ANION GAP SERPL CALC-SCNC: 6 MMOL/L (ref 0–18)
AST SERPL-CCNC: 18 U/L (ref 15–41)
BASOPHILS # BLD AUTO: 0.04 X10(3) UL (ref 0–0.1)
BASOPHILS NFR BLD AUTO: 0.6 %
BILIRUB SERPL-MCNC: 0.4 MG/DL (ref 0.1–2)
BUN BLD-MCNC: 22 MG/DL (ref 8–20)
BUN/CREAT SERPL: 24.4 (ref 10–20)
CALCIUM BLD-MCNC: 9.3 MG/DL (ref 8.3–10.3)
CHLORIDE SERPL-SCNC: 106 MMOL/L (ref 101–111)
CO2 SERPL-SCNC: 31 MMOL/L (ref 22–32)
CREAT BLD-MCNC: 0.9 MG/DL (ref 0.55–1.02)
EOSINOPHIL # BLD AUTO: 0.4 X10(3) UL (ref 0–0.3)
EOSINOPHIL NFR BLD AUTO: 6 %
ERYTHROCYTE [DISTWIDTH] IN BLOOD BY AUTOMATED COUNT: 15.2 % (ref 11.5–16)
GLOBULIN PLAS-MCNC: 3.3 G/DL (ref 2.5–3.7)
GLUCOSE BLD-MCNC: 114 MG/DL (ref 70–99)
HCT VFR BLD AUTO: 37 % (ref 34–50)
HGB BLD-MCNC: 11.6 G/DL (ref 12–16)
IMMATURE GRANULOCYTE COUNT: 0.02 X10(3) UL (ref 0–1)
IMMATURE GRANULOCYTE RATIO %: 0.3 %
LDH: 202 U/L (ref 84–246)
LYMPHOCYTES # BLD AUTO: 2.31 X10(3) UL (ref 0.9–4)
LYMPHOCYTES NFR BLD AUTO: 34.4 %
M PROTEIN MFR SERPL ELPH: 6.8 G/DL (ref 6.1–8.3)
MCH RBC QN AUTO: 27.3 PG (ref 27–33.2)
MCHC RBC AUTO-ENTMCNC: 31.4 G/DL (ref 31–37)
MCV RBC AUTO: 87.1 FL (ref 81–100)
MONOCYTES # BLD AUTO: 0.38 X10(3) UL (ref 0.1–1)
MONOCYTES NFR BLD AUTO: 5.7 %
NEUTROPHIL ABS PRELIM: 3.56 X10 (3) UL (ref 1.3–6.7)
NEUTROPHILS # BLD AUTO: 3.56 X10(3) UL (ref 1.3–6.7)
NEUTROPHILS NFR BLD AUTO: 53 %
OSMOLALITY SERPL CALC.SUM OF ELEC: 300 MOSM/KG (ref 275–295)
PLATELET # BLD AUTO: 222 10(3)UL (ref 150–450)
POTASSIUM SERPL-SCNC: 4 MMOL/L (ref 3.6–5.1)
RBC # BLD AUTO: 4.25 X10(6)UL (ref 3.8–5.1)
RED CELL DISTRIBUTION WIDTH-SD: 49 FL (ref 35.1–46.3)
SODIUM SERPL-SCNC: 143 MMOL/L (ref 136–144)
WBC # BLD AUTO: 6.7 X10(3) UL (ref 4–13)

## 2018-07-20 PROCEDURE — 96413 CHEMO IV INFUSION 1 HR: CPT

## 2018-07-20 PROCEDURE — 83615 LACTATE (LD) (LDH) ENZYME: CPT

## 2018-07-20 PROCEDURE — 99214 OFFICE O/P EST MOD 30 MIN: CPT | Performed by: INTERNAL MEDICINE

## 2018-07-20 PROCEDURE — 85025 COMPLETE CBC W/AUTO DIFF WBC: CPT

## 2018-07-20 PROCEDURE — 96417 CHEMO IV INFUS EACH ADDL SEQ: CPT

## 2018-07-20 PROCEDURE — 80053 COMPREHEN METABOLIC PANEL: CPT

## 2018-07-20 RX ORDER — LETROZOLE 2.5 MG/1
2.5 TABLET, FILM COATED ORAL DAILY
Qty: 30 TABLET | Refills: 11 | Status: SHIPPED | OUTPATIENT
Start: 2018-07-20 | End: 2018-08-31

## 2018-07-20 RX ORDER — SODIUM CHLORIDE 0.9 % (FLUSH) 0.9 %
10 SYRINGE (ML) INJECTION ONCE
Status: CANCELLED | OUTPATIENT
Start: 2018-07-20

## 2018-07-20 RX ORDER — SODIUM CHLORIDE 0.9 % (FLUSH) 0.9 %
10 SYRINGE (ML) INJECTION ONCE
Status: COMPLETED | OUTPATIENT
Start: 2018-07-20 | End: 2018-07-20

## 2018-07-20 RX ADMIN — SODIUM CHLORIDE 0.9 % (FLUSH) 10 ML: 0.9 % SYRINGE (ML) INJECTION at 11:56:00

## 2018-07-20 NOTE — PROGRESS NOTES
Pt here for C7D1.   Arrives Ambulating independently, accompanied by Family member           Modifications in dose or schedule: No     Frequency of blood return and site check throughout administration: Prior to administration   Discharged to Home, [address]

## 2018-07-20 NOTE — PROGRESS NOTES
Cancer Center Progress Note    Problem List:      Patient Active Problem List:     Carcinoma of overlapping sites of left breast in female, estrogen receptor positive ([facility] [address]     Osteoporosis     Osteoarthrosis     Encounter for monitoring cardiotoxic drug t alert and oriented x 3, not in acute distress. Neck: No palpable lymphadenopathy. Neck is supple. Chest: Clear to auscultation. No rales. No wheezes. Heart: Regular rate and rhythm. Abdomen: Soft, non tender with good bowel sounds.   Extremities: No e

## 2018-07-20 NOTE — PROGRESS NOTES
Outpatient Oncology Care Plan  Problem list:  nausea and vomiting    Problems related to:    disease/disease progression    Interventions:  provided general teaching    Expected outcomes:  understands plan of care    Progress towards outcome:  making progr

## 2018-07-24 ENCOUNTER — HOSPITAL ENCOUNTER (OUTPATIENT)
Dept: BONE DENSITY | Age: 83
Discharge: HOME | End: 2018-07-24
Attending: INTERNAL MEDICINE
Payer: MEDICARE

## 2018-07-24 ENCOUNTER — HOSPITAL ENCOUNTER (OUTPATIENT)
Dept: RADIATION ONCOLOGY | Age: 83
Discharge: HOME | End: 2018-07-24
Attending: RADIOLOGY
Payer: MEDICARE

## 2018-07-24 VITALS
HEART RATE: 62 BPM | DIASTOLIC BLOOD PRESSURE: 82 MMHG | TEMPERATURE: 98 F | SYSTOLIC BLOOD PRESSURE: 147 MMHG | RESPIRATION RATE: 16 BRPM | OXYGEN SATURATION: 98 % | WEIGHT: 149.38 LBS | HEIGHT: 63 IN | BODY MASS INDEX: 26.47 KG/M2

## 2018-07-24 DIAGNOSIS — C50.812 CARCINOMA OF OVERLAPPING SITES OF LEFT BREAST IN FEMALE, ESTROGEN RECEPTOR POSITIVE (HCC): ICD-10-CM

## 2018-07-24 DIAGNOSIS — Z17.0 CARCINOMA OF OVERLAPPING SITES OF LEFT BREAST IN FEMALE, ESTROGEN RECEPTOR POSITIVE (HCC): ICD-10-CM

## 2018-07-24 DIAGNOSIS — Z78.0 ASYMPTOMATIC MENOPAUSE: ICD-10-CM

## 2018-07-24 PROCEDURE — 77080 DXA BONE DENSITY AXIAL: CPT | Performed by: INTERNAL MEDICINE

## 2018-07-24 PROCEDURE — 99214 OFFICE O/P EST MOD 30 MIN: CPT

## 2018-07-24 RX ORDER — LORATADINE 10 MG/1
10 TABLET ORAL
COMMUNITY
Start: 2013-04-19

## 2018-07-24 RX ORDER — ASCORBIC ACID 500 MG
500 TABLET ORAL
COMMUNITY
Start: 2013-04-19

## 2018-07-24 RX ORDER — ACETAMINOPHEN 500 MG
500 TABLET ORAL
COMMUNITY
Start: 2016-06-17

## 2018-07-24 NOTE — PROGRESS NOTES
Nursing Consultation Note  Patient: Anaid Sandoval  YOB: 1930  Age: 80year old  Radiation Oncologist: Dr. Lyndsay Morris  Referring Physician: Jake Garcia, Dr. Amber Montero (PCP in [facility]), Dr. Marilyn Zee (surg in [place])  Diagnosis: BILATERAL B implanted cardio device? no  Patient has/has had:    1. Assistive Devices: N/A    2. Flu Vaccination: no-referral to ask PCP    3. Pneumonia Vaccination:  yes    Vital Signs:  There were no vitals filed for this visit., Wt Readings from Last 6 Encounters: SURGERY      Comment: L knee  No date: MASTECTOMY LEFT  No date: MASTECTOMY RIGHT  No date: TONSILLECTOMY      Social History  Social History   Marital status:    Spouse name: N/A    Years of education: N/A  Number of children: 4     Occupational His

## 2018-07-24 NOTE — PATIENT INSTRUCTIONS
RADIATION INSTRUCTIONS:    - CT SIMULATION SCHEDULED FOR July 26 (THURSDAY) AT 10 AM.    - IF YOU HAVE ANY QUESTIONS OR CONCERNS REGARDING RADIATION THERAPY, PLEASE CALL [phone].

## 2018-07-26 ENCOUNTER — HOSPITAL ENCOUNTER (OUTPATIENT)
Dept: RADIATION ONCOLOGY | Age: 83
Discharge: HOME | End: 2018-07-26
Attending: RADIOLOGY
Payer: MEDICARE

## 2018-07-26 PROCEDURE — 77470 SPECIAL RADIATION TREATMENT: CPT | Performed by: RADIOLOGY

## 2018-07-26 PROCEDURE — 77290 THER RAD SIMULAJ FIELD CPLX: CPT | Performed by: RADIOLOGY

## 2018-07-26 PROCEDURE — 77334 RADIATION TREATMENT AID(S): CPT | Performed by: RADIOLOGY

## 2018-07-26 NOTE — CONSULTS
[facility]    PATIENT'S NAME: Mozella Radish   RADIATION ONCOLOGIST: Arjun Ospina M.D.    PATIENT ACCOUNT #: [de-identified] [facility]ATION[facility]   [facility]   MEDICAL RECORD #: [id number] YOB: 1930   CONSULTATION DATE: 07/24/2018       RADIAT activity on PET scan. Ultrasound-guided biopsy of the right breast mass showed invasive ductal carcinoma, low-grade. She underwent right breast mastectomy on 02/01/2018.   Pathology revealed invasive ductal carcinoma, measuring 6 mm, grade 1, with DCI . She is present with her daughter. She denies tobacco or alcohol use. She has 4 children; 2 living sons and 1 daughter, who is present today, who lives in [place]. She spends her brooks in [place].      REVIEW OF SYSTEMS:  As per nursing intake the positive deep margin.   The rationale for postmastectomy chest wall and draining lymph node irradiation, its potential risks, benefits, side effects, as well as alternative forms of treatment, were discussed at length with the patient and her daughter a

## 2018-07-31 ENCOUNTER — SNF/IP PROF CHARGE ONLY (OUTPATIENT)
Dept: HEMATOLOGY/ONCOLOGY | Facility: HOSPITAL | Age: 83
End: 2018-07-31

## 2018-07-31 DIAGNOSIS — C50.812 CARCINOMA OF OVERLAPPING SITES OF LEFT BREAST IN FEMALE, ESTROGEN RECEPTOR POSITIVE (HCC): ICD-10-CM

## 2018-07-31 DIAGNOSIS — Z17.0 CARCINOMA OF OVERLAPPING SITES OF LEFT BREAST IN FEMALE, ESTROGEN RECEPTOR POSITIVE (HCC): ICD-10-CM

## 2018-07-31 PROCEDURE — G9678 ONCOLOGY CARE MODEL SERVICE: HCPCS | Performed by: INTERNAL MEDICINE

## 2018-08-01 ENCOUNTER — HOSPITAL ENCOUNTER (OUTPATIENT)
Dept: RADIATION ONCOLOGY | Age: 83
Discharge: HOME | End: 2018-08-01
Attending: RADIOLOGY
Payer: MEDICARE

## 2018-08-06 PROCEDURE — 77295 3-D RADIOTHERAPY PLAN: CPT | Performed by: RADIOLOGY

## 2018-08-06 PROCEDURE — 77300 RADIATION THERAPY DOSE PLAN: CPT | Performed by: RADIOLOGY

## 2018-08-06 PROCEDURE — 77334 RADIATION TREATMENT AID(S): CPT | Performed by: RADIOLOGY

## 2018-08-09 ENCOUNTER — HOSPITAL ENCOUNTER (OUTPATIENT)
Dept: RADIATION ONCOLOGY | Age: 83
End: 2018-08-09
Attending: RADIOLOGY
Payer: MEDICARE

## 2018-08-10 ENCOUNTER — HOSPITAL ENCOUNTER (OUTPATIENT)
Dept: RADIATION ONCOLOGY | Age: 83
Discharge: HOME | End: 2018-08-10
Attending: RADIOLOGY
Payer: MEDICARE

## 2018-08-10 ENCOUNTER — OFFICE VISIT (OUTPATIENT)
Dept: HEMATOLOGY/ONCOLOGY | Age: 83
End: 2018-08-10
Attending: INTERNAL MEDICINE
Payer: MEDICARE

## 2018-08-10 VITALS
BODY MASS INDEX: 26 KG/M2 | HEART RATE: 63 BPM | TEMPERATURE: 97 F | WEIGHT: 149.19 LBS | SYSTOLIC BLOOD PRESSURE: 170 MMHG | RESPIRATION RATE: 16 BRPM | DIASTOLIC BLOOD PRESSURE: 79 MMHG | OXYGEN SATURATION: 99 %

## 2018-08-10 DIAGNOSIS — Z17.0 CARCINOMA OF OVERLAPPING SITES OF LEFT BREAST IN FEMALE, ESTROGEN RECEPTOR POSITIVE (HCC): Primary | ICD-10-CM

## 2018-08-10 DIAGNOSIS — C50.812 CARCINOMA OF OVERLAPPING SITES OF LEFT BREAST IN FEMALE, ESTROGEN RECEPTOR POSITIVE (HCC): Primary | ICD-10-CM

## 2018-08-10 LAB
ALBUMIN SERPL-MCNC: 3.3 G/DL (ref 3.5–4.8)
ALBUMIN/GLOB SERPL: 1.1 {RATIO} (ref 1–2)
ALP LIVER SERPL-CCNC: 57 U/L (ref 55–142)
ALT SERPL-CCNC: 20 U/L (ref 14–54)
ANION GAP SERPL CALC-SCNC: 7 MMOL/L (ref 0–18)
AST SERPL-CCNC: 19 U/L (ref 15–41)
BASOPHILS # BLD AUTO: 0.04 X10(3) UL (ref 0–0.1)
BASOPHILS NFR BLD AUTO: 0.6 %
BILIRUB SERPL-MCNC: 0.3 MG/DL (ref 0.1–2)
BUN BLD-MCNC: 21 MG/DL (ref 8–20)
BUN/CREAT SERPL: 23.3 (ref 10–20)
CALCIUM BLD-MCNC: 8.8 MG/DL (ref 8.3–10.3)
CHLORIDE SERPL-SCNC: 106 MMOL/L (ref 101–111)
CO2 SERPL-SCNC: 30 MMOL/L (ref 22–32)
CREAT BLD-MCNC: 0.9 MG/DL (ref 0.55–1.02)
EOSINOPHIL # BLD AUTO: 0.32 X10(3) UL (ref 0–0.3)
EOSINOPHIL NFR BLD AUTO: 4.6 %
ERYTHROCYTE [DISTWIDTH] IN BLOOD BY AUTOMATED COUNT: 14.9 % (ref 11.5–16)
GLOBULIN PLAS-MCNC: 3.1 G/DL (ref 2.5–3.7)
GLUCOSE BLD-MCNC: 95 MG/DL (ref 70–99)
HCT VFR BLD AUTO: 37.2 % (ref 34–50)
HGB BLD-MCNC: 11.3 G/DL (ref 12–16)
IMMATURE GRANULOCYTE COUNT: 0.01 X10(3) UL (ref 0–1)
IMMATURE GRANULOCYTE RATIO %: 0.1 %
LYMPHOCYTES # BLD AUTO: 1.91 X10(3) UL (ref 0.9–4)
LYMPHOCYTES NFR BLD AUTO: 27.2 %
M PROTEIN MFR SERPL ELPH: 6.4 G/DL (ref 6.1–8.3)
MCH RBC QN AUTO: 26.3 PG (ref 27–33.2)
MCHC RBC AUTO-ENTMCNC: 30.4 G/DL (ref 31–37)
MCV RBC AUTO: 86.5 FL (ref 81–100)
MONOCYTES # BLD AUTO: 0.43 X10(3) UL (ref 0.1–1)
MONOCYTES NFR BLD AUTO: 6.1 %
NEUTROPHIL ABS PRELIM: 4.32 X10 (3) UL (ref 1.3–6.7)
NEUTROPHILS # BLD AUTO: 4.32 X10(3) UL (ref 1.3–6.7)
NEUTROPHILS NFR BLD AUTO: 61.4 %
OSMOLALITY SERPL CALC.SUM OF ELEC: 299 MOSM/KG (ref 275–295)
PLATELET # BLD AUTO: 198 10(3)UL (ref 150–450)
POTASSIUM SERPL-SCNC: 4.2 MMOL/L (ref 3.6–5.1)
RBC # BLD AUTO: 4.3 X10(6)UL (ref 3.8–5.1)
RED CELL DISTRIBUTION WIDTH-SD: 47.6 FL (ref 35.1–46.3)
SODIUM SERPL-SCNC: 143 MMOL/L (ref 136–144)
WBC # BLD AUTO: 7 X10(3) UL (ref 4–13)

## 2018-08-10 PROCEDURE — 96413 CHEMO IV INFUSION 1 HR: CPT

## 2018-08-10 PROCEDURE — 77280 THER RAD SIMULAJ FIELD SMPL: CPT | Performed by: RADIOLOGY

## 2018-08-10 PROCEDURE — 85025 COMPLETE CBC W/AUTO DIFF WBC: CPT

## 2018-08-10 PROCEDURE — 77321 SPECIAL TELETX PORT PLAN: CPT | Performed by: RADIOLOGY

## 2018-08-10 PROCEDURE — 77334 RADIATION TREATMENT AID(S): CPT | Performed by: RADIOLOGY

## 2018-08-10 PROCEDURE — 96417 CHEMO IV INFUS EACH ADDL SEQ: CPT

## 2018-08-10 PROCEDURE — 80053 COMPREHEN METABOLIC PANEL: CPT

## 2018-08-10 PROCEDURE — 77290 THER RAD SIMULAJ FIELD CPLX: CPT | Performed by: RADIOLOGY

## 2018-08-10 NOTE — PATIENT INSTRUCTIONS
To reach Dr Vincent Boxer nurse during business hours, please call [phone]. After hours, including weekends, evenings, and holidays, please call the main number [phone] for emergent needs.

## 2018-08-10 NOTE — PROGRESS NOTES
Pt here for C8D1.   Arrives Ambulating independently, accompanied by Son         Modifications in dose or schedule: No     Frequency of blood return and site check throughout administration: Prior to administration   Discharged to Home, Ambulating independe

## 2018-08-13 ENCOUNTER — HOSPITAL ENCOUNTER (OUTPATIENT)
Dept: RADIATION ONCOLOGY | Age: 83
Discharge: HOME | End: 2018-08-13
Attending: RADIOLOGY
Payer: MEDICARE

## 2018-08-13 DIAGNOSIS — C50.812 CARCINOMA OF OVERLAPPING SITES OF LEFT BREAST IN FEMALE, ESTROGEN RECEPTOR POSITIVE (HCC): Primary | ICD-10-CM

## 2018-08-13 DIAGNOSIS — Z17.0 CARCINOMA OF OVERLAPPING SITES OF LEFT BREAST IN FEMALE, ESTROGEN RECEPTOR POSITIVE (HCC): Primary | ICD-10-CM

## 2018-08-13 PROCEDURE — 77412 RADIATION TX DELIVERY LVL 3: CPT | Performed by: RADIOLOGY

## 2018-08-13 RX ORDER — MOMETASONE FUROATE 1 MG/G
1 CREAM TOPICAL 2 TIMES DAILY PRN
Qty: 50 G | Refills: 3 | Status: SHIPPED | OUTPATIENT
Start: 2018-08-13 | End: 2021-10-01 | Stop reason: ALTCHOICE

## 2018-08-13 NOTE — PROGRESS NOTES
[facility] Radiation Treatment Management Note 1-5    Patient:  Christine Allen  Age:  80year old  Visit Diagnosis:    1.  Carcinoma of overlapping sites of left breast in female, estrogen receptor positive ([facility] [address]      Primary Rad/Onc:

## 2018-08-14 PROCEDURE — 77331 SPECIAL RADIATION DOSIMETRY: CPT | Performed by: RADIOLOGY

## 2018-08-14 PROCEDURE — 77412 RADIATION TX DELIVERY LVL 3: CPT | Performed by: RADIOLOGY

## 2018-08-15 PROCEDURE — 77412 RADIATION TX DELIVERY LVL 3: CPT | Performed by: RADIOLOGY

## 2018-08-16 PROCEDURE — 77412 RADIATION TX DELIVERY LVL 3: CPT | Performed by: RADIOLOGY

## 2018-08-17 PROCEDURE — 77417 THER RADIOLOGY PORT IMAGE(S): CPT | Performed by: RADIOLOGY

## 2018-08-17 PROCEDURE — 77336 RADIATION PHYSICS CONSULT: CPT | Performed by: RADIOLOGY

## 2018-08-17 PROCEDURE — 77412 RADIATION TX DELIVERY LVL 3: CPT | Performed by: RADIOLOGY

## 2018-08-20 ENCOUNTER — NURSE ONLY (OUTPATIENT)
Dept: HEMATOLOGY/ONCOLOGY | Facility: HOSPITAL | Age: 83
End: 2018-08-20

## 2018-08-20 ENCOUNTER — HOSPITAL ENCOUNTER (OUTPATIENT)
Dept: RADIATION ONCOLOGY | Age: 83
Discharge: HOME | End: 2018-08-20
Attending: RADIOLOGY
Payer: MEDICARE

## 2018-08-20 ENCOUNTER — OFFICE VISIT (OUTPATIENT)
Dept: FAMILY MEDICINE CLINIC | Facility: CLINIC | Age: 83
End: 2018-08-20
Payer: MEDICARE

## 2018-08-20 VITALS
SYSTOLIC BLOOD PRESSURE: 144 MMHG | TEMPERATURE: 98 F | OXYGEN SATURATION: 98 % | WEIGHT: 148.31 LBS | HEART RATE: 61 BPM | RESPIRATION RATE: 16 BRPM | BODY MASS INDEX: 26 KG/M2 | DIASTOLIC BLOOD PRESSURE: 78 MMHG

## 2018-08-20 VITALS
TEMPERATURE: 98 F | OXYGEN SATURATION: 99 % | HEIGHT: 63 IN | HEART RATE: 67 BPM | BODY MASS INDEX: 26.43 KG/M2 | SYSTOLIC BLOOD PRESSURE: 126 MMHG | WEIGHT: 149.19 LBS | DIASTOLIC BLOOD PRESSURE: 84 MMHG | RESPIRATION RATE: 18 BRPM

## 2018-08-20 DIAGNOSIS — C50.812 CARCINOMA OF OVERLAPPING SITES OF LEFT BREAST IN FEMALE, ESTROGEN RECEPTOR POSITIVE (HCC): Primary | ICD-10-CM

## 2018-08-20 DIAGNOSIS — Z17.0 CARCINOMA OF OVERLAPPING SITES OF LEFT BREAST IN FEMALE, ESTROGEN RECEPTOR POSITIVE (HCC): Primary | ICD-10-CM

## 2018-08-20 DIAGNOSIS — L03.031 PARONYCHIA OF TOE OF RIGHT FOOT: Primary | ICD-10-CM

## 2018-08-20 PROCEDURE — 77412 RADIATION TX DELIVERY LVL 3: CPT | Performed by: RADIOLOGY

## 2018-08-20 PROCEDURE — 99202 OFFICE O/P NEW SF 15 MIN: CPT | Performed by: NURSE PRACTITIONER

## 2018-08-20 RX ORDER — SULFAMETHOXAZOLE AND TRIMETHOPRIM 800; 160 MG/1; MG/1
1 TABLET ORAL 2 TIMES DAILY
Qty: 10 TABLET | Refills: 0 | Status: SHIPPED | OUTPATIENT
Start: 2018-08-20 | End: 2018-08-25

## 2018-08-20 NOTE — PROGRESS NOTES
Received call from RT MD.  Pt c/o infected second toe on right foot. She has been putting hydrogen peroxide on daily without improvement for about 3 days. Pt instructed to call PCP however, she lives 76 miles away and is staying with dtr locally for RT.

## 2018-08-20 NOTE — PROGRESS NOTES
[facility] Radiation Treatment Management Note 6-10    Patient:  Norman Deras  Age:  80year old  Visit Diagnosis:    1.  Carcinoma of overlapping sites of left breast in female, estrogen receptor positive ([facility])      Primary Rad/Onc:

## 2018-08-20 NOTE — PATIENT INSTRUCTIONS
· Please start Bactrim DB twice daily for five days. · Stop hydrogen peroxide as well as Neosporin to area. · May use Bacitracin ointment only if desired to toe. · See below for care. If not markedly improved in 24 hours please follow up with podiatry. healthcare provider right away if any of these occur:  · Redness, pain, or swelling of the finger or toe gets worse  · Red streaks in the skin leading away from the wound  · Pus or fluid draining from the nail area  · Fever of 100.4ºF (38ºC) or higher, or

## 2018-08-20 NOTE — PROGRESS NOTES
HPI:    Patient ID: Marti Schultz is a 80year old female. Second toe of right foot with swelling and redness at the nailbed. Some drainage. Has no pain unless \"pushed on\". Has been using Hydrogen peroxide as well as Neosporin to the area.       Toe Pa Skin: She is not diaphoretic. Right second toe with redness and scant clear drainage to the cuticle bed medially, redness and slight edema to DIP. No streaking noted.                ASSESSMENT/PLAN:   Paronychia of toe of right foot  (primary encount · If your infection is on a toe, wear comfortable shoes with a lot of toe room. You can also wear open-toed sandals while your toe heals.   · You may use over-the-counter medicine (acetaminophen or ibuprofen to help with pain, unless another medicine was pr

## 2018-08-21 PROCEDURE — 77412 RADIATION TX DELIVERY LVL 3: CPT | Performed by: RADIOLOGY

## 2018-08-22 PROCEDURE — 77412 RADIATION TX DELIVERY LVL 3: CPT | Performed by: RADIOLOGY

## 2018-08-23 PROCEDURE — 77412 RADIATION TX DELIVERY LVL 3: CPT | Performed by: RADIOLOGY

## 2018-08-24 PROCEDURE — 77412 RADIATION TX DELIVERY LVL 3: CPT | Performed by: RADIOLOGY

## 2018-08-24 PROCEDURE — 77417 THER RADIOLOGY PORT IMAGE(S): CPT | Performed by: RADIOLOGY

## 2018-08-24 PROCEDURE — 77336 RADIATION PHYSICS CONSULT: CPT | Performed by: RADIOLOGY

## 2018-08-24 NOTE — PROGRESS NOTES
[facility] Radiation Treatment Management Note 11-15    Patient:  Mic Perez  Age:  80year old  Visit Diagnosis:    1.  Carcinoma of overlapping sites of left breast in female, estrogen receptor positive ([facility] [address]      Primary Rad/Onc

## 2018-08-27 ENCOUNTER — HOSPITAL ENCOUNTER (OUTPATIENT)
Dept: RADIATION ONCOLOGY | Age: 83
Discharge: HOME | End: 2018-08-27
Attending: RADIOLOGY
Payer: MEDICARE

## 2018-08-27 VITALS
TEMPERATURE: 98 F | RESPIRATION RATE: 16 BRPM | SYSTOLIC BLOOD PRESSURE: 164 MMHG | OXYGEN SATURATION: 95 % | HEART RATE: 72 BPM | DIASTOLIC BLOOD PRESSURE: 87 MMHG | BODY MASS INDEX: 26 KG/M2 | WEIGHT: 146 LBS

## 2018-08-27 DIAGNOSIS — Z17.0 CARCINOMA OF OVERLAPPING SITES OF LEFT BREAST IN FEMALE, ESTROGEN RECEPTOR POSITIVE (HCC): Primary | ICD-10-CM

## 2018-08-27 DIAGNOSIS — C50.812 CARCINOMA OF OVERLAPPING SITES OF LEFT BREAST IN FEMALE, ESTROGEN RECEPTOR POSITIVE (HCC): Primary | ICD-10-CM

## 2018-08-27 PROCEDURE — 77412 RADIATION TX DELIVERY LVL 3: CPT | Performed by: RADIOLOGY

## 2018-08-28 PROCEDURE — 77412 RADIATION TX DELIVERY LVL 3: CPT | Performed by: RADIOLOGY

## 2018-08-29 PROCEDURE — 77412 RADIATION TX DELIVERY LVL 3: CPT | Performed by: RADIOLOGY

## 2018-08-30 PROCEDURE — 77412 RADIATION TX DELIVERY LVL 3: CPT | Performed by: RADIOLOGY

## 2018-08-31 ENCOUNTER — OFFICE VISIT (OUTPATIENT)
Dept: HEMATOLOGY/ONCOLOGY | Age: 83
End: 2018-08-31
Attending: INTERNAL MEDICINE
Payer: MEDICARE

## 2018-08-31 ENCOUNTER — SNF/IP PROF CHARGE ONLY (OUTPATIENT)
Dept: HEMATOLOGY/ONCOLOGY | Facility: HOSPITAL | Age: 83
End: 2018-08-31

## 2018-08-31 VITALS
SYSTOLIC BLOOD PRESSURE: 142 MMHG | OXYGEN SATURATION: 99 % | BODY MASS INDEX: 26 KG/M2 | TEMPERATURE: 99 F | DIASTOLIC BLOOD PRESSURE: 77 MMHG | RESPIRATION RATE: 16 BRPM | HEART RATE: 72 BPM | WEIGHT: 148 LBS

## 2018-08-31 DIAGNOSIS — Z17.0 CARCINOMA OF OVERLAPPING SITES OF LEFT BREAST IN FEMALE, ESTROGEN RECEPTOR POSITIVE (HCC): Primary | ICD-10-CM

## 2018-08-31 DIAGNOSIS — Z17.0 CARCINOMA OF OVERLAPPING SITES OF LEFT BREAST IN FEMALE, ESTROGEN RECEPTOR POSITIVE (HCC): ICD-10-CM

## 2018-08-31 DIAGNOSIS — C50.812 CARCINOMA OF OVERLAPPING SITES OF LEFT BREAST IN FEMALE, ESTROGEN RECEPTOR POSITIVE (HCC): Primary | ICD-10-CM

## 2018-08-31 DIAGNOSIS — C50.812 CARCINOMA OF OVERLAPPING SITES OF LEFT BREAST IN FEMALE, ESTROGEN RECEPTOR POSITIVE (HCC): ICD-10-CM

## 2018-08-31 LAB
ALBUMIN SERPL-MCNC: 3.6 G/DL (ref 3.5–4.8)
ALBUMIN/GLOB SERPL: 1.1 {RATIO} (ref 1–2)
ALP LIVER SERPL-CCNC: 61 U/L (ref 55–142)
ALT SERPL-CCNC: 20 U/L (ref 14–54)
ANION GAP SERPL CALC-SCNC: 7 MMOL/L (ref 0–18)
AST SERPL-CCNC: 18 U/L (ref 15–41)
BASOPHILS # BLD AUTO: 0.05 X10(3) UL (ref 0–0.1)
BASOPHILS NFR BLD AUTO: 0.9 %
BILIRUB SERPL-MCNC: 0.4 MG/DL (ref 0.1–2)
BUN BLD-MCNC: 31 MG/DL (ref 8–20)
BUN/CREAT SERPL: 30.7 (ref 10–20)
CALCIUM BLD-MCNC: 9.2 MG/DL (ref 8.3–10.3)
CHLORIDE SERPL-SCNC: 105 MMOL/L (ref 101–111)
CO2 SERPL-SCNC: 28 MMOL/L (ref 22–32)
CREAT BLD-MCNC: 1.01 MG/DL (ref 0.55–1.02)
EOSINOPHIL # BLD AUTO: 0.33 X10(3) UL (ref 0–0.3)
EOSINOPHIL NFR BLD AUTO: 5.7 %
ERYTHROCYTE [DISTWIDTH] IN BLOOD BY AUTOMATED COUNT: 15.1 % (ref 11.5–16)
GLOBULIN PLAS-MCNC: 3.3 G/DL (ref 2.5–4)
GLUCOSE BLD-MCNC: 128 MG/DL (ref 70–99)
HCT VFR BLD AUTO: 38.4 % (ref 34–50)
HGB BLD-MCNC: 11.9 G/DL (ref 12–16)
IMMATURE GRANULOCYTE COUNT: 0.01 X10(3) UL (ref 0–1)
IMMATURE GRANULOCYTE RATIO %: 0.2 %
LYMPHOCYTES # BLD AUTO: 1.27 X10(3) UL (ref 0.9–4)
LYMPHOCYTES NFR BLD AUTO: 21.7 %
M PROTEIN MFR SERPL ELPH: 6.9 G/DL (ref 6.1–8.3)
MCH RBC QN AUTO: 26.5 PG (ref 27–33.2)
MCHC RBC AUTO-ENTMCNC: 31 G/DL (ref 31–37)
MCV RBC AUTO: 85.5 FL (ref 81–100)
MONOCYTES # BLD AUTO: 0.35 X10(3) UL (ref 0.1–1)
MONOCYTES NFR BLD AUTO: 6 %
NEUTROPHIL ABS PRELIM: 3.83 X10 (3) UL (ref 1.3–6.7)
NEUTROPHILS # BLD AUTO: 3.83 X10(3) UL (ref 1.3–6.7)
NEUTROPHILS NFR BLD AUTO: 65.5 %
OSMOLALITY SERPL CALC.SUM OF ELEC: 298 MOSM/KG (ref 275–295)
PLATELET # BLD AUTO: 204 10(3)UL (ref 150–450)
POTASSIUM SERPL-SCNC: 4.3 MMOL/L (ref 3.6–5.1)
RBC # BLD AUTO: 4.49 X10(6)UL (ref 3.8–5.1)
RED CELL DISTRIBUTION WIDTH-SD: 47.1 FL (ref 35.1–46.3)
SODIUM SERPL-SCNC: 140 MMOL/L (ref 136–144)
WBC # BLD AUTO: 5.8 X10(3) UL (ref 4–13)

## 2018-08-31 PROCEDURE — 96417 CHEMO IV INFUS EACH ADDL SEQ: CPT

## 2018-08-31 PROCEDURE — 99214 OFFICE O/P EST MOD 30 MIN: CPT | Performed by: INTERNAL MEDICINE

## 2018-08-31 PROCEDURE — 80053 COMPREHEN METABOLIC PANEL: CPT

## 2018-08-31 PROCEDURE — 77412 RADIATION TX DELIVERY LVL 3: CPT | Performed by: RADIOLOGY

## 2018-08-31 PROCEDURE — 85025 COMPLETE CBC W/AUTO DIFF WBC: CPT

## 2018-08-31 PROCEDURE — 96413 CHEMO IV INFUSION 1 HR: CPT

## 2018-08-31 PROCEDURE — G9678 ONCOLOGY CARE MODEL SERVICE: HCPCS | Performed by: INTERNAL MEDICINE

## 2018-08-31 PROCEDURE — 77417 THER RADIOLOGY PORT IMAGE(S): CPT | Performed by: RADIOLOGY

## 2018-08-31 RX ORDER — SODIUM CHLORIDE 0.9 % (FLUSH) 0.9 %
10 SYRINGE (ML) INJECTION ONCE
Status: COMPLETED | OUTPATIENT
Start: 2018-08-31 | End: 2018-08-31

## 2018-08-31 RX ORDER — LETROZOLE 2.5 MG/1
2.5 TABLET, FILM COATED ORAL DAILY
Qty: 90 TABLET | Refills: 3 | Status: SHIPPED | OUTPATIENT
Start: 2018-08-31 | End: 2019-10-28

## 2018-08-31 RX ORDER — SODIUM CHLORIDE 0.9 % (FLUSH) 0.9 %
10 SYRINGE (ML) INJECTION ONCE
Status: CANCELLED | OUTPATIENT
Start: 2018-08-31

## 2018-08-31 RX ADMIN — SODIUM CHLORIDE 0.9 % (FLUSH) 10 ML: 0.9 % SYRINGE (ML) INJECTION at 11:55:00

## 2018-08-31 NOTE — PROGRESS NOTES
Pt here for Herceptin/Perjeta treatment, h/o breast ca .   Arrives Ambulating independently, accompanied by Family member           Modifications in dose or schedule: No     Frequency of blood return and site check throughout administration: Prior to admini

## 2018-08-31 NOTE — PROGRESS NOTES
[facility] Radiation Treatment Management Note 16-20    Patient:  Juan Luis Fraga  Age:  80year old  Visit Diagnosis:    1.  Carcinoma of overlapping sites of left breast in female, estrogen receptor positive ([facility] [address]      Primary Rad/Onc

## 2018-08-31 NOTE — PROGRESS NOTES
Cancer Center Progress Note    Problem List:      Patient Active Problem List:     Carcinoma of overlapping sites of left breast in female, estrogen receptor positive ([facility] [address]     Osteoporosis     Osteoarthrosis     Encounter for monitoring cardiotoxic drug t loratadine 10 MG Oral Tab Take 10 mg by mouth. Disp:  Rfl:    Multiple Vitamin (MULTI-VITAMIN) Oral Tab Take by mouth. Disp:  Rfl:    Multiple Vitamins-Minerals (EYE VITAMINS OR) Take by mouth.  Disp:  Rfl:    letrozole 2.5 MG Oral Tab Take 1 tablet (2.5 positive  HER2 2+\=    She completed 12 cycles of taxol/trastuzumab/pertuzumab (first cycle was on 2/23/2018). She will continue the TP every 3 weeks. She will complete radiation therapy. We will continue with letrozole.  I will see her in six weeks just pr

## 2018-09-01 ENCOUNTER — HOSPITAL ENCOUNTER (OUTPATIENT)
Dept: RADIATION ONCOLOGY | Age: 83
Discharge: HOME | End: 2018-09-01
Attending: RADIOLOGY
Payer: MEDICARE

## 2018-09-04 ENCOUNTER — HOSPITAL ENCOUNTER (OUTPATIENT)
Dept: RADIATION ONCOLOGY | Age: 83
Discharge: HOME | End: 2018-09-04
Attending: RADIOLOGY
Payer: MEDICARE

## 2018-09-04 VITALS
RESPIRATION RATE: 16 BRPM | WEIGHT: 149.5 LBS | OXYGEN SATURATION: 100 % | SYSTOLIC BLOOD PRESSURE: 174 MMHG | BODY MASS INDEX: 26 KG/M2 | TEMPERATURE: 98 F | HEART RATE: 64 BPM | DIASTOLIC BLOOD PRESSURE: 82 MMHG

## 2018-09-04 DIAGNOSIS — C50.812 CARCINOMA OF OVERLAPPING SITES OF LEFT BREAST IN FEMALE, ESTROGEN RECEPTOR POSITIVE (HCC): Primary | ICD-10-CM

## 2018-09-04 DIAGNOSIS — Z17.0 CARCINOMA OF OVERLAPPING SITES OF LEFT BREAST IN FEMALE, ESTROGEN RECEPTOR POSITIVE (HCC): Primary | ICD-10-CM

## 2018-09-04 PROCEDURE — 77417 THER RADIOLOGY PORT IMAGE(S): CPT | Performed by: RADIOLOGY

## 2018-09-04 PROCEDURE — 77412 RADIATION TX DELIVERY LVL 3: CPT | Performed by: RADIOLOGY

## 2018-09-05 PROCEDURE — 77412 RADIATION TX DELIVERY LVL 3: CPT | Performed by: RADIOLOGY

## 2018-09-06 PROCEDURE — 77412 RADIATION TX DELIVERY LVL 3: CPT | Performed by: RADIOLOGY

## 2018-09-07 PROCEDURE — 77336 RADIATION PHYSICS CONSULT: CPT | Performed by: RADIOLOGY

## 2018-09-07 PROCEDURE — 77412 RADIATION TX DELIVERY LVL 3: CPT | Performed by: RADIOLOGY

## 2018-09-07 NOTE — PROGRESS NOTES
[facility] Radiation Treatment Management Note 16-20    Patient:  Ole Whiting  Age:  80year old  Visit Diagnosis:    1.  Carcinoma of overlapping sites of left breast in female, estrogen receptor positive ([facility] [address]      Primary Rad/Onc

## 2018-09-10 ENCOUNTER — HOSPITAL ENCOUNTER (OUTPATIENT)
Dept: RADIATION ONCOLOGY | Age: 83
Discharge: HOME | End: 2018-09-10
Attending: RADIOLOGY
Payer: MEDICARE

## 2018-09-10 VITALS
DIASTOLIC BLOOD PRESSURE: 81 MMHG | OXYGEN SATURATION: 100 % | WEIGHT: 150.63 LBS | SYSTOLIC BLOOD PRESSURE: 184 MMHG | HEART RATE: 60 BPM | RESPIRATION RATE: 16 BRPM | BODY MASS INDEX: 27 KG/M2 | TEMPERATURE: 97 F

## 2018-09-10 DIAGNOSIS — C50.812 CARCINOMA OF OVERLAPPING SITES OF LEFT BREAST IN FEMALE, ESTROGEN RECEPTOR POSITIVE (HCC): Primary | ICD-10-CM

## 2018-09-10 DIAGNOSIS — Z17.0 CARCINOMA OF OVERLAPPING SITES OF LEFT BREAST IN FEMALE, ESTROGEN RECEPTOR POSITIVE (HCC): Primary | ICD-10-CM

## 2018-09-10 PROCEDURE — 77412 RADIATION TX DELIVERY LVL 3: CPT | Performed by: RADIOLOGY

## 2018-09-10 PROCEDURE — 77417 THER RADIOLOGY PORT IMAGE(S): CPT | Performed by: RADIOLOGY

## 2018-09-10 RX ORDER — CLINDAMYCIN HYDROCHLORIDE 300 MG/1
CAPSULE ORAL
Refills: 0 | COMMUNITY
Start: 2018-09-06 | End: 2019-04-12

## 2018-09-11 PROCEDURE — 77412 RADIATION TX DELIVERY LVL 3: CPT | Performed by: RADIOLOGY

## 2018-09-12 PROCEDURE — 77412 RADIATION TX DELIVERY LVL 3: CPT | Performed by: RADIOLOGY

## 2018-09-13 PROCEDURE — 77412 RADIATION TX DELIVERY LVL 3: CPT | Performed by: RADIOLOGY

## 2018-09-13 NOTE — PROGRESS NOTES
[facility] Radiation Treatment Management Note 21-25    Patient:  Mic Perez  Age:  80year old  Visit Diagnosis:    1.  Carcinoma of overlapping sites of left breast in female, estrogen receptor positive ([facility] [address]      Primary Rad/Onc

## 2018-09-14 ENCOUNTER — HOSPITAL ENCOUNTER (OUTPATIENT)
Dept: RADIATION ONCOLOGY | Age: 83
Discharge: HOME | End: 2018-09-14
Attending: RADIOLOGY
Payer: MEDICARE

## 2018-09-14 PROCEDURE — 77332 RADIATION TREATMENT AID(S): CPT | Performed by: RADIOLOGY

## 2018-09-14 PROCEDURE — 77336 RADIATION PHYSICS CONSULT: CPT | Performed by: RADIOLOGY

## 2018-09-14 PROCEDURE — 77412 RADIATION TX DELIVERY LVL 3: CPT | Performed by: RADIOLOGY

## 2018-09-14 PROCEDURE — 77280 THER RAD SIMULAJ FIELD SMPL: CPT | Performed by: RADIOLOGY

## 2018-09-17 ENCOUNTER — HOSPITAL ENCOUNTER (OUTPATIENT)
Dept: RADIATION ONCOLOGY | Age: 83
Discharge: HOME | End: 2018-09-17
Attending: RADIOLOGY
Payer: MEDICARE

## 2018-09-17 VITALS
TEMPERATURE: 99 F | RESPIRATION RATE: 16 BRPM | OXYGEN SATURATION: 98 % | SYSTOLIC BLOOD PRESSURE: 135 MMHG | HEART RATE: 65 BPM | BODY MASS INDEX: 27 KG/M2 | WEIGHT: 151 LBS | DIASTOLIC BLOOD PRESSURE: 77 MMHG

## 2018-09-17 DIAGNOSIS — Z17.0 CARCINOMA OF OVERLAPPING SITES OF LEFT BREAST IN FEMALE, ESTROGEN RECEPTOR POSITIVE (HCC): Primary | ICD-10-CM

## 2018-09-17 DIAGNOSIS — C50.812 CARCINOMA OF OVERLAPPING SITES OF LEFT BREAST IN FEMALE, ESTROGEN RECEPTOR POSITIVE (HCC): Primary | ICD-10-CM

## 2018-09-17 PROCEDURE — 77412 RADIATION TX DELIVERY LVL 3: CPT | Performed by: RADIOLOGY

## 2018-09-18 PROCEDURE — 77412 RADIATION TX DELIVERY LVL 3: CPT | Performed by: RADIOLOGY

## 2018-09-19 PROCEDURE — 77412 RADIATION TX DELIVERY LVL 3: CPT | Performed by: RADIOLOGY

## 2018-09-20 ENCOUNTER — OFFICE VISIT (OUTPATIENT)
Dept: HEMATOLOGY/ONCOLOGY | Age: 83
End: 2018-09-20
Attending: INTERNAL MEDICINE
Payer: MEDICARE

## 2018-09-20 VITALS
DIASTOLIC BLOOD PRESSURE: 68 MMHG | HEART RATE: 63 BPM | WEIGHT: 149.5 LBS | TEMPERATURE: 98 F | SYSTOLIC BLOOD PRESSURE: 102 MMHG | RESPIRATION RATE: 16 BRPM | BODY MASS INDEX: 26 KG/M2 | OXYGEN SATURATION: 99 %

## 2018-09-20 DIAGNOSIS — Z17.0 CARCINOMA OF OVERLAPPING SITES OF LEFT BREAST IN FEMALE, ESTROGEN RECEPTOR POSITIVE (HCC): Primary | ICD-10-CM

## 2018-09-20 DIAGNOSIS — C50.812 CARCINOMA OF OVERLAPPING SITES OF LEFT BREAST IN FEMALE, ESTROGEN RECEPTOR POSITIVE (HCC): Primary | ICD-10-CM

## 2018-09-20 LAB
ALBUMIN SERPL-MCNC: 3.6 G/DL (ref 3.5–4.8)
ALBUMIN/GLOB SERPL: 1.1 {RATIO} (ref 1–2)
ALP LIVER SERPL-CCNC: 64 U/L (ref 55–142)
ALT SERPL-CCNC: 24 U/L (ref 14–54)
ANION GAP SERPL CALC-SCNC: 7 MMOL/L (ref 0–18)
AST SERPL-CCNC: 20 U/L (ref 15–41)
BASOPHILS # BLD AUTO: 0.03 X10(3) UL (ref 0–0.1)
BASOPHILS NFR BLD AUTO: 0.5 %
BILIRUB SERPL-MCNC: 0.4 MG/DL (ref 0.1–2)
BUN BLD-MCNC: 30 MG/DL (ref 8–20)
BUN/CREAT SERPL: 34.1 (ref 10–20)
CALCIUM BLD-MCNC: 9.3 MG/DL (ref 8.3–10.3)
CHLORIDE SERPL-SCNC: 105 MMOL/L (ref 101–111)
CO2 SERPL-SCNC: 28 MMOL/L (ref 22–32)
CREAT BLD-MCNC: 0.88 MG/DL (ref 0.55–1.02)
EOSINOPHIL # BLD AUTO: 0.28 X10(3) UL (ref 0–0.3)
EOSINOPHIL NFR BLD AUTO: 5.1 %
ERYTHROCYTE [DISTWIDTH] IN BLOOD BY AUTOMATED COUNT: 15.8 % (ref 11.5–16)
GLOBULIN PLAS-MCNC: 3.2 G/DL (ref 2.5–4)
GLUCOSE BLD-MCNC: 97 MG/DL (ref 70–99)
HCT VFR BLD AUTO: 37.6 % (ref 34–50)
HGB BLD-MCNC: 11.6 G/DL (ref 12–16)
IMMATURE GRANULOCYTE COUNT: 0.01 X10(3) UL (ref 0–1)
IMMATURE GRANULOCYTE RATIO %: 0.2 %
LYMPHOCYTES # BLD AUTO: 0.97 X10(3) UL (ref 0.9–4)
LYMPHOCYTES NFR BLD AUTO: 17.7 %
M PROTEIN MFR SERPL ELPH: 6.8 G/DL (ref 6.1–8.3)
MCH RBC QN AUTO: 26.2 PG (ref 27–33.2)
MCHC RBC AUTO-ENTMCNC: 30.9 G/DL (ref 31–37)
MCV RBC AUTO: 85.1 FL (ref 81–100)
MONOCYTES # BLD AUTO: 0.42 X10(3) UL (ref 0.1–1)
MONOCYTES NFR BLD AUTO: 7.7 %
NEUTROPHIL ABS PRELIM: 3.78 X10 (3) UL (ref 1.3–6.7)
NEUTROPHILS # BLD AUTO: 3.78 X10(3) UL (ref 1.3–6.7)
NEUTROPHILS NFR BLD AUTO: 68.8 %
OSMOLALITY SERPL CALC.SUM OF ELEC: 296 MOSM/KG (ref 275–295)
PLATELET # BLD AUTO: 184 10(3)UL (ref 150–450)
POTASSIUM SERPL-SCNC: 4.2 MMOL/L (ref 3.6–5.1)
RBC # BLD AUTO: 4.42 X10(6)UL (ref 3.8–5.1)
RED CELL DISTRIBUTION WIDTH-SD: 49.1 FL (ref 35.1–46.3)
SODIUM SERPL-SCNC: 140 MMOL/L (ref 136–144)
WBC # BLD AUTO: 5.5 X10(3) UL (ref 4–13)

## 2018-09-20 PROCEDURE — 96417 CHEMO IV INFUS EACH ADDL SEQ: CPT

## 2018-09-20 PROCEDURE — 85025 COMPLETE CBC W/AUTO DIFF WBC: CPT

## 2018-09-20 PROCEDURE — 96413 CHEMO IV INFUSION 1 HR: CPT

## 2018-09-20 PROCEDURE — 77412 RADIATION TX DELIVERY LVL 3: CPT | Performed by: RADIOLOGY

## 2018-09-20 PROCEDURE — 80053 COMPREHEN METABOLIC PANEL: CPT

## 2018-09-20 NOTE — PROGRESS NOTES
Pt here for C10D1.   Arrives Ambulating independently, accompanied by            Modifications in dose or schedule: No     Frequency of blood return and site check throughout administration: Prior to administration   Discharged to Home, Ambulating independe

## 2018-09-21 ENCOUNTER — TELEPHONE (OUTPATIENT)
Dept: HEMATOLOGY/ONCOLOGY | Facility: HOSPITAL | Age: 83
End: 2018-09-21

## 2018-09-21 ENCOUNTER — OFFICE VISIT (OUTPATIENT)
Dept: HEMATOLOGY/ONCOLOGY | Age: 83
End: 2018-09-21
Attending: INTERNAL MEDICINE
Payer: MEDICARE

## 2018-09-21 PROCEDURE — 77336 RADIATION PHYSICS CONSULT: CPT | Performed by: RADIOLOGY

## 2018-09-21 NOTE — PATIENT INSTRUCTIONS
POST-RADIATION INSTRUCTIONS:   - CALL [phone] FOR A FOLLOW-UP WITH DR. Jeane Brumfield 3 MONTHS AFTER RADIATION COMPLETION  - FOLLOW-UP WITH DR. Goldman 49 ON OCTOBER 12 AT 1:00 PM   - FOLLOW-UP WITH SURGEON IN [place] AS RECOMMENDED  - CALL [phone] TO S

## 2018-09-21 NOTE — PROGRESS NOTES
[facility] Radiation Treatment Management Note 26-30    Patient:  Norman Deras  Age:  80year old  Visit Diagnosis:    1.  Carcinoma of overlapping sites of left breast in female, estrogen receptor positive ([facility] [address]      Primary Rad/Onc

## 2018-09-24 ENCOUNTER — HOSPITAL ENCOUNTER (OUTPATIENT)
Dept: RADIATION ONCOLOGY | Age: 83
Discharge: HOME | End: 2018-09-24
Attending: RADIOLOGY
Payer: MEDICARE

## 2018-09-24 VITALS
BODY MASS INDEX: 27 KG/M2 | RESPIRATION RATE: 16 BRPM | WEIGHT: 149.88 LBS | OXYGEN SATURATION: 98 % | TEMPERATURE: 98 F | SYSTOLIC BLOOD PRESSURE: 145 MMHG | HEART RATE: 64 BPM | DIASTOLIC BLOOD PRESSURE: 80 MMHG

## 2018-09-24 DIAGNOSIS — C50.812 CARCINOMA OF OVERLAPPING SITES OF LEFT BREAST IN FEMALE, ESTROGEN RECEPTOR POSITIVE (HCC): Primary | ICD-10-CM

## 2018-09-24 DIAGNOSIS — Z17.0 CARCINOMA OF OVERLAPPING SITES OF LEFT BREAST IN FEMALE, ESTROGEN RECEPTOR POSITIVE (HCC): Primary | ICD-10-CM

## 2018-09-24 PROCEDURE — 77412 RADIATION TX DELIVERY LVL 3: CPT | Performed by: RADIOLOGY

## 2018-09-25 PROCEDURE — 77412 RADIATION TX DELIVERY LVL 3: CPT | Performed by: RADIOLOGY

## 2018-09-28 ENCOUNTER — MEDICAL CORRESPONDENCE (OUTPATIENT)
Dept: RADIATION ONCOLOGY | Age: 83
End: 2018-09-28

## 2018-09-28 NOTE — PROGRESS NOTES
RADIATION ONCOLOGY TREATMENT SUMMARY         [address] Location: [facility]   Date of Birth   4/17/1930 Radiation Oncologist     Flavia Parker MD, [facility]       RADIATION ONCOLOGY TREATMENT SUMMARY     PHYSICIANS:  [place] biopsy of the right breast mass showed invasive ductal carcinoma, low-grade.       She underwent right breast mastectomy on 02/01/2018. Pathology revealed invasive ductal carcinoma, measuring 6 mm, grade 1, with DCIS grade 2. Margins were negative.   One patient was informed what to expect with any further radiation therapy effects. FOLLOWUP AND PLAN: The patient will seek routine follow up in [place] where she was moving in a few weeks.   The patient will contact the office if there are any new symptom

## 2018-09-30 ENCOUNTER — SNF/IP PROF CHARGE ONLY (OUTPATIENT)
Dept: HEMATOLOGY/ONCOLOGY | Facility: HOSPITAL | Age: 83
End: 2018-09-30

## 2018-09-30 DIAGNOSIS — C50.812 CARCINOMA OF OVERLAPPING SITES OF LEFT BREAST IN FEMALE, ESTROGEN RECEPTOR POSITIVE (HCC): ICD-10-CM

## 2018-09-30 DIAGNOSIS — Z17.0 CARCINOMA OF OVERLAPPING SITES OF LEFT BREAST IN FEMALE, ESTROGEN RECEPTOR POSITIVE (HCC): ICD-10-CM

## 2018-09-30 PROCEDURE — G9678 ONCOLOGY CARE MODEL SERVICE: HCPCS | Performed by: INTERNAL MEDICINE

## 2018-10-08 PROBLEM — C50.911 NEOPLASM OF RIGHT BREAST, REGIONAL LYMPH NODE STAGING CATEGORY PN0 PER AMERICAN JOINT COMMITTEE ON CANCER STAGING GUIDELINES, 7TH EDITION (HCC): Status: ACTIVE | Noted: 2018-10-08

## 2018-10-12 ENCOUNTER — TELEPHONE (OUTPATIENT)
Dept: HEMATOLOGY/ONCOLOGY | Facility: HOSPITAL | Age: 83
End: 2018-10-12

## 2018-10-12 ENCOUNTER — APPOINTMENT (OUTPATIENT)
Dept: HEMATOLOGY/ONCOLOGY | Age: 83
End: 2018-10-12
Attending: INTERNAL MEDICINE
Payer: MEDICARE

## 2018-10-12 ENCOUNTER — OFFICE VISIT (OUTPATIENT)
Dept: HEMATOLOGY/ONCOLOGY | Age: 83
End: 2018-10-12
Attending: INTERNAL MEDICINE
Payer: MEDICARE

## 2018-10-12 ENCOUNTER — HOSPITAL ENCOUNTER (OUTPATIENT)
Dept: CV DIAGNOSTICS | Facility: HOSPITAL | Age: 83
Discharge: HOME | End: 2018-10-12
Attending: INTERNAL MEDICINE
Payer: MEDICARE

## 2018-10-12 VITALS
OXYGEN SATURATION: 100 % | BODY MASS INDEX: 27 KG/M2 | WEIGHT: 151.5 LBS | SYSTOLIC BLOOD PRESSURE: 166 MMHG | RESPIRATION RATE: 18 BRPM | DIASTOLIC BLOOD PRESSURE: 72 MMHG | TEMPERATURE: 98 F | HEART RATE: 75 BPM

## 2018-10-12 DIAGNOSIS — C50.812 CARCINOMA OF OVERLAPPING SITES OF LEFT BREAST IN FEMALE, ESTROGEN RECEPTOR POSITIVE (HCC): Primary | ICD-10-CM

## 2018-10-12 DIAGNOSIS — Z79.899 ENCOUNTER FOR MONITORING CARDIOTOXIC DRUG THERAPY: ICD-10-CM

## 2018-10-12 DIAGNOSIS — C50.812 CARCINOMA OF OVERLAPPING SITES OF LEFT BREAST IN FEMALE, ESTROGEN RECEPTOR POSITIVE (HCC): ICD-10-CM

## 2018-10-12 DIAGNOSIS — Z17.0 CARCINOMA OF OVERLAPPING SITES OF LEFT BREAST IN FEMALE, ESTROGEN RECEPTOR POSITIVE (HCC): Primary | ICD-10-CM

## 2018-10-12 DIAGNOSIS — Z51.81 ENCOUNTER FOR MONITORING CARDIOTOXIC DRUG THERAPY: ICD-10-CM

## 2018-10-12 DIAGNOSIS — Z17.0 CARCINOMA OF OVERLAPPING SITES OF LEFT BREAST IN FEMALE, ESTROGEN RECEPTOR POSITIVE (HCC): ICD-10-CM

## 2018-10-12 PROCEDURE — 93306 TTE W/DOPPLER COMPLETE: CPT | Performed by: INTERNAL MEDICINE

## 2018-10-12 PROCEDURE — 85025 COMPLETE CBC W/AUTO DIFF WBC: CPT

## 2018-10-12 PROCEDURE — 99214 OFFICE O/P EST MOD 30 MIN: CPT | Performed by: INTERNAL MEDICINE

## 2018-10-12 PROCEDURE — 96417 CHEMO IV INFUS EACH ADDL SEQ: CPT

## 2018-10-12 PROCEDURE — 80053 COMPREHEN METABOLIC PANEL: CPT

## 2018-10-12 PROCEDURE — 96413 CHEMO IV INFUSION 1 HR: CPT

## 2018-10-12 NOTE — PROGRESS NOTES
Pt here for C11D1.   Arrives Ambulating independently, accompanied by Family member           Modifications in dose or schedule: No     Frequency of blood return and site check throughout administration: Prior to administration   Discharged to Home, [place] [address]

## 2018-10-12 NOTE — PATIENT INSTRUCTIONS
Call to schedule appointment with Dr. Wilfred Craig in about 6 months. [phone]    To reach Dr Sherrill Christopher nurse during business hours, please call [phone].   After hours, including weekends, evenings, and holidays, please call the main number [phone] for e

## 2018-10-23 ENCOUNTER — TELEPHONE (OUTPATIENT)
Dept: HEMATOLOGY/ONCOLOGY | Facility: HOSPITAL | Age: 83
End: 2018-10-23

## 2018-10-23 DIAGNOSIS — C50.812 CARCINOMA OF OVERLAPPING SITES OF LEFT BREAST IN FEMALE, ESTROGEN RECEPTOR POSITIVE (HCC): Primary | ICD-10-CM

## 2018-10-23 DIAGNOSIS — Z17.0 CARCINOMA OF OVERLAPPING SITES OF LEFT BREAST IN FEMALE, ESTROGEN RECEPTOR POSITIVE (HCC): Primary | ICD-10-CM

## 2018-10-31 ENCOUNTER — SNF/IP PROF CHARGE ONLY (OUTPATIENT)
Dept: HEMATOLOGY/ONCOLOGY | Facility: HOSPITAL | Age: 83
End: 2018-10-31

## 2018-10-31 DIAGNOSIS — C50.812 CARCINOMA OF OVERLAPPING SITES OF LEFT BREAST IN FEMALE, ESTROGEN RECEPTOR POSITIVE (HCC): ICD-10-CM

## 2018-10-31 DIAGNOSIS — Z17.0 CARCINOMA OF OVERLAPPING SITES OF LEFT BREAST IN FEMALE, ESTROGEN RECEPTOR POSITIVE (HCC): ICD-10-CM

## 2018-10-31 PROCEDURE — G9678 ONCOLOGY CARE MODEL SERVICE: HCPCS | Performed by: INTERNAL MEDICINE

## 2018-11-30 ENCOUNTER — SNF/IP PROF CHARGE ONLY (OUTPATIENT)
Dept: HEMATOLOGY/ONCOLOGY | Facility: HOSPITAL | Age: 83
End: 2018-11-30

## 2018-11-30 DIAGNOSIS — C50.812 CARCINOMA OF OVERLAPPING SITES OF LEFT BREAST IN FEMALE, ESTROGEN RECEPTOR POSITIVE (HCC): ICD-10-CM

## 2018-11-30 DIAGNOSIS — Z17.0 CARCINOMA OF OVERLAPPING SITES OF LEFT BREAST IN FEMALE, ESTROGEN RECEPTOR POSITIVE (HCC): ICD-10-CM

## 2018-11-30 PROCEDURE — G9678 ONCOLOGY CARE MODEL SERVICE: HCPCS | Performed by: INTERNAL MEDICINE

## 2018-12-31 ENCOUNTER — SNF/IP PROF CHARGE ONLY (OUTPATIENT)
Dept: HEMATOLOGY/ONCOLOGY | Facility: HOSPITAL | Age: 83
End: 2018-12-31

## 2018-12-31 DIAGNOSIS — C50.812 CARCINOMA OF OVERLAPPING SITES OF LEFT BREAST IN FEMALE, ESTROGEN RECEPTOR POSITIVE (HCC): ICD-10-CM

## 2018-12-31 DIAGNOSIS — Z17.0 CARCINOMA OF OVERLAPPING SITES OF LEFT BREAST IN FEMALE, ESTROGEN RECEPTOR POSITIVE (HCC): ICD-10-CM

## 2018-12-31 PROCEDURE — G9678 ONCOLOGY CARE MODEL SERVICE: HCPCS | Performed by: INTERNAL MEDICINE

## 2019-01-31 ENCOUNTER — SNF/IP PROF CHARGE ONLY (OUTPATIENT)
Dept: HEMATOLOGY/ONCOLOGY | Facility: HOSPITAL | Age: 84
End: 2019-01-31

## 2019-01-31 DIAGNOSIS — C50.812 CARCINOMA OF OVERLAPPING SITES OF LEFT BREAST IN FEMALE, ESTROGEN RECEPTOR POSITIVE (HCC): ICD-10-CM

## 2019-01-31 DIAGNOSIS — Z17.0 CARCINOMA OF OVERLAPPING SITES OF LEFT BREAST IN FEMALE, ESTROGEN RECEPTOR POSITIVE (HCC): ICD-10-CM

## 2019-01-31 PROCEDURE — G9678 ONCOLOGY CARE MODEL SERVICE: HCPCS | Performed by: INTERNAL MEDICINE

## 2019-02-28 ENCOUNTER — SNF/IP PROF CHARGE ONLY (OUTPATIENT)
Dept: HEMATOLOGY/ONCOLOGY | Facility: HOSPITAL | Age: 84
End: 2019-02-28

## 2019-02-28 DIAGNOSIS — C50.812 CARCINOMA OF OVERLAPPING SITES OF LEFT BREAST IN FEMALE, ESTROGEN RECEPTOR POSITIVE (HCC): ICD-10-CM

## 2019-02-28 DIAGNOSIS — Z17.0 CARCINOMA OF OVERLAPPING SITES OF LEFT BREAST IN FEMALE, ESTROGEN RECEPTOR POSITIVE (HCC): ICD-10-CM

## 2019-02-28 PROCEDURE — G9678 ONCOLOGY CARE MODEL SERVICE: HCPCS | Performed by: INTERNAL MEDICINE

## 2019-04-12 ENCOUNTER — OFFICE VISIT (OUTPATIENT)
Dept: HEMATOLOGY/ONCOLOGY | Age: 84
End: 2019-04-12
Attending: INTERNAL MEDICINE
Payer: MEDICARE

## 2019-04-12 VITALS
HEART RATE: 78 BPM | OXYGEN SATURATION: 95 % | BODY MASS INDEX: 25 KG/M2 | WEIGHT: 143.31 LBS | TEMPERATURE: 97 F | RESPIRATION RATE: 18 BRPM

## 2019-04-12 DIAGNOSIS — C50.812 CARCINOMA OF OVERLAPPING SITES OF LEFT BREAST IN FEMALE, ESTROGEN RECEPTOR POSITIVE (HCC): Primary | ICD-10-CM

## 2019-04-12 DIAGNOSIS — Z17.0 CARCINOMA OF OVERLAPPING SITES OF LEFT BREAST IN FEMALE, ESTROGEN RECEPTOR POSITIVE (HCC): Primary | ICD-10-CM

## 2019-04-12 PROCEDURE — 99214 OFFICE O/P EST MOD 30 MIN: CPT | Performed by: INTERNAL MEDICINE

## 2019-04-12 NOTE — PROGRESS NOTES
Cancer Center Progress Note    Problem List:      Patient Active Problem List:     Carcinoma of overlapping sites of left breast in female, estrogen receptor positive ([facility] [address]     Osteoporosis     Osteoarthrosis     Encounter for monitoring cardiotoxic drug t Vitamin C 500 MG Oral Tab Take 500 mg by mouth. Disp:  Rfl:    CALCIUM OR Take 600 mg by mouth. Disp:  Rfl:    loratadine 10 MG Oral Tab Take 10 mg by mouth. Disp:  Rfl:    Misc Natural Products (GLUCOSAMINE CHOND COMPLEX/MSM OR) Take by mouth.  Disp:  Rf cancer:  Left breast cancer in overlapping quadrants:  HER2 positive  6 cm cancer with 1/7 nodes positive (extranodal extension)  Right breast cancer, low grade  ER/OH positive  HER2 2+\=  Taxol/Herceptin/Pertuzumab x 12 started on 2/23/2018  Completed in

## 2019-05-21 ENCOUNTER — TELEPHONE (OUTPATIENT)
Dept: HEMATOLOGY/ONCOLOGY | Facility: HOSPITAL | Age: 84
End: 2019-05-21

## 2019-08-26 ENCOUNTER — TELEPHONE (OUTPATIENT)
Dept: HEMATOLOGY/ONCOLOGY | Facility: HOSPITAL | Age: 84
End: 2019-08-26

## 2019-08-26 NOTE — TELEPHONE ENCOUNTER
Tried a 3rd time to contact pt to discuss Survivorship; either an appt; or a mail-phone option, or to decline participation completely,

## 2019-10-07 ENCOUNTER — TELEPHONE (OUTPATIENT)
Dept: HEMATOLOGY/ONCOLOGY | Facility: HOSPITAL | Age: 84
End: 2019-10-07

## 2019-10-11 ENCOUNTER — OFFICE VISIT (OUTPATIENT)
Dept: HEMATOLOGY/ONCOLOGY | Age: 84
End: 2019-10-11
Attending: INTERNAL MEDICINE
Payer: MEDICARE

## 2019-10-11 VITALS
BODY MASS INDEX: 26 KG/M2 | WEIGHT: 146 LBS | SYSTOLIC BLOOD PRESSURE: 176 MMHG | HEART RATE: 64 BPM | DIASTOLIC BLOOD PRESSURE: 75 MMHG | TEMPERATURE: 98 F | OXYGEN SATURATION: 97 % | RESPIRATION RATE: 18 BRPM

## 2019-10-11 DIAGNOSIS — C50.812 CARCINOMA OF OVERLAPPING SITES OF LEFT BREAST IN FEMALE, ESTROGEN RECEPTOR POSITIVE (HCC): Primary | ICD-10-CM

## 2019-10-11 DIAGNOSIS — Z17.0 CARCINOMA OF OVERLAPPING SITES OF LEFT BREAST IN FEMALE, ESTROGEN RECEPTOR POSITIVE (HCC): Primary | ICD-10-CM

## 2019-10-11 PROCEDURE — 99213 OFFICE O/P EST LOW 20 MIN: CPT | Performed by: INTERNAL MEDICINE

## 2019-10-11 NOTE — PROGRESS NOTES
Cancer Center Progress Note    Problem List:      Patient Active Problem List:     Carcinoma of overlapping sites of left breast in female, estrogen receptor positive ([facility] [address]     Osteoporosis     Osteoarthrosis     Encounter for monitoring cardiotoxic drug t appropriate for this visit. The pertinent positives and negatives were described. All other systems were negative.     PMH/PSH:  Past Medical History:   Diagnosis Date   • Breast cancer [facility])        Past Surgical History:   Procedure Laterality Date   • CA Patient is alert and oriented x 3, not in acute distress. Eyes: Anicteric sclera, pink conjunctiva. HEENT:  Oropharynx is clear. Neck is supple. Respiratory: Clear to auscultation and percussion. No rales. No wheezes.   Cardiovascular: Regular rate and well with YEMI. She will continue on the letrozole daily. She can now follow with us at six month intervals. She will return to see me in six months. She was comfortable with the plan.     Visit start at 12:57 PM      Sofia Salinas MD

## 2019-10-28 DIAGNOSIS — C50.812 CARCINOMA OF OVERLAPPING SITES OF LEFT BREAST IN FEMALE, ESTROGEN RECEPTOR POSITIVE (HCC): ICD-10-CM

## 2019-10-28 DIAGNOSIS — Z17.0 CARCINOMA OF OVERLAPPING SITES OF LEFT BREAST IN FEMALE, ESTROGEN RECEPTOR POSITIVE (HCC): ICD-10-CM

## 2019-10-28 RX ORDER — LETROZOLE 2.5 MG/1
TABLET, FILM COATED ORAL
Qty: 90 TABLET | Refills: 2 | Status: SHIPPED | OUTPATIENT
Start: 2019-10-28 | End: 2020-04-24

## 2020-04-24 ENCOUNTER — VIRTUAL PHONE E/M (OUTPATIENT)
Dept: HEMATOLOGY/ONCOLOGY | Facility: HOSPITAL | Age: 85
End: 2020-04-24
Attending: INTERNAL MEDICINE
Payer: MEDICARE

## 2020-04-24 ENCOUNTER — APPOINTMENT (OUTPATIENT)
Dept: HEMATOLOGY/ONCOLOGY | Age: 85
End: 2020-04-24
Attending: INTERNAL MEDICINE
Payer: MEDICARE

## 2020-04-24 DIAGNOSIS — Z17.0 CARCINOMA OF OVERLAPPING SITES OF LEFT BREAST IN FEMALE, ESTROGEN RECEPTOR POSITIVE (HCC): ICD-10-CM

## 2020-04-24 DIAGNOSIS — D64.9 NORMOCYTIC ANEMIA: ICD-10-CM

## 2020-04-24 DIAGNOSIS — C50.812 CARCINOMA OF OVERLAPPING SITES OF LEFT BREAST IN FEMALE, ESTROGEN RECEPTOR POSITIVE (HCC): ICD-10-CM

## 2020-04-24 PROCEDURE — 99442 PHONE E/M BY PHYS 11-20 MIN: CPT | Performed by: INTERNAL MEDICINE

## 2020-04-24 RX ORDER — LETROZOLE 2.5 MG/1
2.5 TABLET, FILM COATED ORAL DAILY
Qty: 90 TABLET | Refills: 3 | Status: SHIPPED | OUTPATIENT
Start: 2020-04-24 | End: 2021-02-23

## 2020-04-24 NOTE — PROGRESS NOTES
Virtual Telephone Check-In    Mic Perez verbally consents to a Virtual/Telephone Check-In visit on 04/24/20. Patient understands and accepts financial responsibility for any deductible, co-insurance and/or co-pays associated with this service.     Du

## 2020-10-05 ENCOUNTER — LAB ENCOUNTER (OUTPATIENT)
Dept: LAB | Age: 85
End: 2020-10-05
Attending: INTERNAL MEDICINE
Payer: MEDICARE

## 2020-10-05 DIAGNOSIS — Z17.0 CARCINOMA OF OVERLAPPING SITES OF LEFT BREAST IN FEMALE, ESTROGEN RECEPTOR POSITIVE (HCC): ICD-10-CM

## 2020-10-05 DIAGNOSIS — C50.812 CARCINOMA OF OVERLAPPING SITES OF LEFT BREAST IN FEMALE, ESTROGEN RECEPTOR POSITIVE (HCC): ICD-10-CM

## 2020-10-05 PROCEDURE — 80053 COMPREHEN METABOLIC PANEL: CPT

## 2020-10-05 PROCEDURE — 83615 LACTATE (LD) (LDH) ENZYME: CPT

## 2020-10-05 PROCEDURE — 85025 COMPLETE CBC W/AUTO DIFF WBC: CPT

## 2020-10-05 PROCEDURE — 36415 COLL VENOUS BLD VENIPUNCTURE: CPT

## 2020-10-09 ENCOUNTER — OFFICE VISIT (OUTPATIENT)
Dept: HEMATOLOGY/ONCOLOGY | Age: 85
End: 2020-10-09
Attending: INTERNAL MEDICINE
Payer: MEDICARE

## 2020-10-09 VITALS
SYSTOLIC BLOOD PRESSURE: 161 MMHG | OXYGEN SATURATION: 98 % | RESPIRATION RATE: 18 BRPM | HEART RATE: 66 BPM | BODY MASS INDEX: 27.42 KG/M2 | HEIGHT: 64.02 IN | DIASTOLIC BLOOD PRESSURE: 75 MMHG | TEMPERATURE: 98 F | WEIGHT: 160.63 LBS

## 2020-10-09 DIAGNOSIS — C50.812 CARCINOMA OF OVERLAPPING SITES OF LEFT BREAST IN FEMALE, ESTROGEN RECEPTOR POSITIVE (HCC): Primary | ICD-10-CM

## 2020-10-09 DIAGNOSIS — Z17.0 CARCINOMA OF OVERLAPPING SITES OF LEFT BREAST IN FEMALE, ESTROGEN RECEPTOR POSITIVE (HCC): Primary | ICD-10-CM

## 2020-10-09 PROCEDURE — 99213 OFFICE O/P EST LOW 20 MIN: CPT | Performed by: INTERNAL MEDICINE

## 2020-10-09 NOTE — PROGRESS NOTES
Cancer Center Progress Note    Problem List:      Patient Active Problem List:     Carcinoma of overlapping sites of left breast in female, estrogen receptor positive ([facility] [address]     Osteoporosis     Osteoarthrosis     Encounter for monitoring cardiotoxic drug t appropriate for this visit. The pertinent positives and negatives were described. All other systems were negative.     PMH/PSH:  Past Medical History:   Diagnosis Date   • Breast cancer [facility])        Past Surgical History:   Procedure Laterality Date   • CA ECOG 0: Fully active, able to carry on all pre-disease performance without restriction     Physical Examination:    Constitutional: Patient is alert and oriented x 3, not in acute distress. Eyes: Anicteric sclera, pink conjunctiva.   HEENT:  Oropharynx is TOTAL HIP ANALYSIS RESULTS:        Bone mineral density (BMD) (g/cm2):  0.799      Total Hip T-Score:  -1.2      % young normals:  85      % age matched controls:  80      Change from prior hip examination:  N/A  This places patient at [employer]

## 2021-02-23 ENCOUNTER — TELEPHONE (OUTPATIENT)
Dept: HEMATOLOGY/ONCOLOGY | Facility: HOSPITAL | Age: 86
End: 2021-02-23

## 2021-02-23 DIAGNOSIS — C50.812 CARCINOMA OF OVERLAPPING SITES OF LEFT BREAST IN FEMALE, ESTROGEN RECEPTOR POSITIVE (HCC): ICD-10-CM

## 2021-02-23 DIAGNOSIS — Z17.0 CARCINOMA OF OVERLAPPING SITES OF LEFT BREAST IN FEMALE, ESTROGEN RECEPTOR POSITIVE (HCC): ICD-10-CM

## 2021-02-23 RX ORDER — LETROZOLE 2.5 MG/1
2.5 TABLET, FILM COATED ORAL DAILY
Qty: 90 TABLET | Refills: 0 | Status: SHIPPED | OUTPATIENT
Start: 2021-02-23 | End: 2021-08-12

## 2021-02-23 NOTE — TELEPHONE ENCOUNTER
Patient called stated her insurance changed and can no longer use [facility] for her prescriptions. She is currently in [place] and needs a Letrozole refill. Refill sent to the [facility] [place] as requested.

## 2021-03-05 DIAGNOSIS — Z23 NEED FOR VACCINATION: ICD-10-CM

## 2021-04-09 ENCOUNTER — TELEPHONE (OUTPATIENT)
Dept: HEMATOLOGY/ONCOLOGY | Facility: HOSPITAL | Age: 86
End: 2021-04-09

## 2021-04-09 ENCOUNTER — APPOINTMENT (OUTPATIENT)
Dept: HEMATOLOGY/ONCOLOGY | Age: 86
End: 2021-04-09
Attending: INTERNAL MEDICINE
Payer: MEDICARE

## 2021-04-09 NOTE — TELEPHONE ENCOUNTER
Madalyn Mays called asking to speak with 's nurse. She says she has to cancel her appointment today, and asking if she can change this appointment a phone or video visit. She says if not she can try to reschedule.

## 2021-04-15 ENCOUNTER — VIRTUAL PHONE E/M (OUTPATIENT)
Dept: HEMATOLOGY/ONCOLOGY | Facility: HOSPITAL | Age: 86
End: 2021-04-15
Attending: INTERNAL MEDICINE
Payer: MEDICARE

## 2021-04-15 DIAGNOSIS — C50.812 CARCINOMA OF OVERLAPPING SITES OF LEFT BREAST IN FEMALE, ESTROGEN RECEPTOR POSITIVE (HCC): ICD-10-CM

## 2021-04-15 DIAGNOSIS — Z17.0 CARCINOMA OF OVERLAPPING SITES OF LEFT BREAST IN FEMALE, ESTROGEN RECEPTOR POSITIVE (HCC): ICD-10-CM

## 2021-04-15 PROCEDURE — 99441 PHONE E/M BY PHYS 5-10 MIN: CPT | Performed by: INTERNAL MEDICINE

## 2021-04-15 NOTE — PROGRESS NOTES
Cancer Center Progress Note    Problem List:      Patient Active Problem List:     Carcinoma of overlapping sites of left breast in female, estrogen receptor positive ([facility] [address]     Osteoporosis     Osteoarthrosis     Encounter for monitoring cardiotoxic drug t appropriate for this visit. The pertinent positives and negatives were described. All other systems were negative.     PMH/PSH:  Past Medical History:   Diagnosis Date   • Breast cancer [facility])        Past Surgical History:   Procedure Laterality Date   • CA (g/cm2):  0.924    Lumbar T-Score:  -0.9      % young normals:  91      % age matched controls:  130      Change from prior spine examination:  N/A  This places patient at [address]) classification of normal.               TOTAL HIP AN

## 2021-08-12 DIAGNOSIS — C50.812 CARCINOMA OF OVERLAPPING SITES OF LEFT BREAST IN FEMALE, ESTROGEN RECEPTOR POSITIVE: ICD-10-CM

## 2021-08-12 DIAGNOSIS — Z17.0 CARCINOMA OF OVERLAPPING SITES OF LEFT BREAST IN FEMALE, ESTROGEN RECEPTOR POSITIVE: ICD-10-CM

## 2021-08-12 RX ORDER — LETROZOLE 2.5 MG/1
TABLET, FILM COATED ORAL
Qty: 90 TABLET | Refills: 0 | Status: SHIPPED | OUTPATIENT
Start: 2021-08-12 | End: 2021-10-01

## 2021-10-01 ENCOUNTER — OFFICE VISIT (OUTPATIENT)
Dept: HEMATOLOGY/ONCOLOGY | Age: 86
End: 2021-10-01
Attending: INTERNAL MEDICINE
Payer: MEDICARE

## 2021-10-01 VITALS
HEART RATE: 61 BPM | DIASTOLIC BLOOD PRESSURE: 85 MMHG | SYSTOLIC BLOOD PRESSURE: 201 MMHG | TEMPERATURE: 99 F | BODY MASS INDEX: 27.77 KG/M2 | HEIGHT: 64.02 IN | OXYGEN SATURATION: 98 % | WEIGHT: 162.69 LBS | RESPIRATION RATE: 18 BRPM

## 2021-10-01 DIAGNOSIS — Z78.0 ASYMPTOMATIC MENOPAUSE: ICD-10-CM

## 2021-10-01 DIAGNOSIS — Z17.0 CARCINOMA OF OVERLAPPING SITES OF LEFT BREAST IN FEMALE, ESTROGEN RECEPTOR POSITIVE (HCC): Primary | ICD-10-CM

## 2021-10-01 DIAGNOSIS — C50.812 CARCINOMA OF OVERLAPPING SITES OF LEFT BREAST IN FEMALE, ESTROGEN RECEPTOR POSITIVE (HCC): Primary | ICD-10-CM

## 2021-10-01 PROCEDURE — 99214 OFFICE O/P EST MOD 30 MIN: CPT | Performed by: INTERNAL MEDICINE

## 2021-10-01 RX ORDER — DICYCLOMINE HYDROCHLORIDE 10 MG/1
10 CAPSULE ORAL 3 TIMES DAILY
COMMUNITY
Start: 2021-07-03

## 2021-10-01 RX ORDER — LETROZOLE 2.5 MG/1
2.5 TABLET, FILM COATED ORAL DAILY
Qty: 90 TABLET | Refills: 3 | Status: SHIPPED | OUTPATIENT
Start: 2021-10-01

## 2021-10-01 NOTE — PROGRESS NOTES
Patient is here for follow up. She is taking letrozole. She denies any difficulties. No joint aches, no hot flashes. She has macular degeneration and has had some injections to her right eye to treat this. She thinks that this has helped.      Educatio

## 2021-10-01 NOTE — PROGRESS NOTES
Cancer Center Progress Note    Problem List:      Patient Active Problem List:     Carcinoma of overlapping sites of left breast in female, estrogen receptor positive ([facility] [address]     Osteoporosis     Osteoarthrosis     Encounter for monitoring cardiotoxic drug t Gluten Meal             DIARRHEA    Medications:  dicyclomine 10 MG Oral Cap, Take 10 mg by mouth 3 (three) times daily. , Disp: , Rfl:   LETROZOLE 2.5 MG Oral Tab, TAKE 1 TABLET(2.5 MG) BY MOUTH DAILY, Disp: 90 tablet, Rfl: 0  acetaminophen 500 MG Oral T 9.9 10/05/2020    ALKPHO 58 10/05/2020    ALT 22 10/05/2020    AST 13 (L) 10/05/2020    BILT 0.3 10/05/2020    ALB 3.6 10/05/2020    TP 7.0 10/05/2020       Radiologic imaging reviewed at this visit:    Dexa scan on 7/24/2020:  LUMBAR SPINE ANALYSIS RESULT comfortable with the plan. Osteopenia:    She will be due for a repeat bone density next visit. This was ordered. Continue calcium and vit d.     Arely Arita MD

## 2022-04-01 ENCOUNTER — OFFICE VISIT (OUTPATIENT)
Dept: HEMATOLOGY/ONCOLOGY | Age: 87
End: 2022-04-01
Attending: INTERNAL MEDICINE
Payer: MEDICARE

## 2022-04-01 VITALS
DIASTOLIC BLOOD PRESSURE: 85 MMHG | HEART RATE: 57 BPM | TEMPERATURE: 98 F | SYSTOLIC BLOOD PRESSURE: 203 MMHG | WEIGHT: 159.38 LBS | BODY MASS INDEX: 27 KG/M2 | OXYGEN SATURATION: 96 %

## 2022-04-01 DIAGNOSIS — C50.812 CARCINOMA OF OVERLAPPING SITES OF LEFT BREAST IN FEMALE, ESTROGEN RECEPTOR POSITIVE (HCC): Primary | ICD-10-CM

## 2022-04-01 DIAGNOSIS — Z17.0 CARCINOMA OF OVERLAPPING SITES OF LEFT BREAST IN FEMALE, ESTROGEN RECEPTOR POSITIVE (HCC): Primary | ICD-10-CM

## 2022-04-01 LAB
ALBUMIN SERPL-MCNC: 3.7 G/DL (ref 3.4–5)
ALBUMIN/GLOB SERPL: 1.1 {RATIO} (ref 1–2)
ALP LIVER SERPL-CCNC: 68 U/L
ALT SERPL-CCNC: 26 U/L
ANION GAP SERPL CALC-SCNC: 4 MMOL/L (ref 0–18)
BASOPHILS # BLD AUTO: 0.05 X10(3) UL (ref 0–0.2)
BASOPHILS NFR BLD AUTO: 0.6 %
BILIRUB SERPL-MCNC: 0.5 MG/DL (ref 0.1–2)
BUN BLD-MCNC: 22 MG/DL (ref 7–18)
CALCIUM BLD-MCNC: 9.9 MG/DL (ref 8.5–10.1)
CHLORIDE SERPL-SCNC: 104 MMOL/L (ref 98–112)
CO2 SERPL-SCNC: 31 MMOL/L (ref 21–32)
CREAT BLD-MCNC: 0.99 MG/DL
EOSINOPHIL # BLD AUTO: 0.32 X10(3) UL (ref 0–0.7)
EOSINOPHIL NFR BLD AUTO: 4.1 %
ERYTHROCYTE [DISTWIDTH] IN BLOOD BY AUTOMATED COUNT: 14.6 %
FASTING STATUS PATIENT QL REPORTED: NO
GLOBULIN PLAS-MCNC: 3.3 G/DL (ref 2.8–4.4)
GLUCOSE BLD-MCNC: 118 MG/DL (ref 70–99)
HCT VFR BLD AUTO: 45.1 %
HGB BLD-MCNC: 14.3 G/DL
IMM GRANULOCYTES # BLD AUTO: 0.01 X10(3) UL (ref 0–1)
IMM GRANULOCYTES NFR BLD: 0.1 %
LDH SERPL L TO P-CCNC: 201 U/L
LYMPHOCYTES # BLD AUTO: 1.55 X10(3) UL (ref 1–4)
LYMPHOCYTES NFR BLD AUTO: 20.1 %
MCH RBC QN AUTO: 29.3 PG (ref 26–34)
MCHC RBC AUTO-ENTMCNC: 31.7 G/DL (ref 31–37)
MCV RBC AUTO: 92.4 FL
MONOCYTES # BLD AUTO: 0.4 X10(3) UL (ref 0.1–1)
MONOCYTES NFR BLD AUTO: 5.2 %
NEUTROPHILS # BLD AUTO: 5.4 X10 (3) UL (ref 1.5–7.7)
NEUTROPHILS # BLD AUTO: 5.4 X10(3) UL (ref 1.5–7.7)
NEUTROPHILS NFR BLD AUTO: 69.9 %
OSMOLALITY SERPL CALC.SUM OF ELEC: 292 MOSM/KG (ref 275–295)
PLATELET # BLD AUTO: 226 10(3)UL (ref 150–450)
POTASSIUM SERPL-SCNC: 4.3 MMOL/L (ref 3.5–5.1)
PROT SERPL-MCNC: 7 G/DL (ref 6.4–8.2)
RBC # BLD AUTO: 4.88 X10(6)UL
SODIUM SERPL-SCNC: 139 MMOL/L (ref 136–145)
WBC # BLD AUTO: 7.7 X10(3) UL (ref 4–11)

## 2022-04-01 PROCEDURE — 99213 OFFICE O/P EST LOW 20 MIN: CPT | Performed by: INTERNAL MEDICINE

## 2022-04-01 NOTE — PROGRESS NOTES
Patient is here for follow up for breast cancer on letrozole. She denies any hot flashes, joint pains or any side effects from the letrozole. She is feeling fairly well but is slowing down. She is eating well. She feels that her balance is fairly good. She denies any fever. She occasionally has some shortness of breath with exertion. She thinks that she is due for a bone density test. This was ordered in October but she did not schedule. Her blood pressure is high today but she says it is only when she comes to the doctor.      Education Record    Learner:  Patient    Disease / Diagnosis: Breast cancer    Barriers / Limitations:  None   Comments:    Method:  Discussion   Comments:    General Topics:  Side effects and symptom management   Comments:    Outcome:  Shows understanding   Comments:

## 2022-09-30 ENCOUNTER — OFFICE VISIT (OUTPATIENT)
Dept: HEMATOLOGY/ONCOLOGY | Age: 87
End: 2022-09-30
Attending: INTERNAL MEDICINE

## 2022-09-30 VITALS
BODY MASS INDEX: 27.17 KG/M2 | OXYGEN SATURATION: 97 % | HEART RATE: 56 BPM | RESPIRATION RATE: 18 BRPM | HEIGHT: 64.02 IN | WEIGHT: 159.13 LBS | TEMPERATURE: 98 F | DIASTOLIC BLOOD PRESSURE: 80 MMHG | SYSTOLIC BLOOD PRESSURE: 218 MMHG

## 2022-09-30 DIAGNOSIS — Z17.0 CARCINOMA OF OVERLAPPING SITES OF LEFT BREAST IN FEMALE, ESTROGEN RECEPTOR POSITIVE (HCC): Primary | ICD-10-CM

## 2022-09-30 DIAGNOSIS — C50.812 CARCINOMA OF OVERLAPPING SITES OF LEFT BREAST IN FEMALE, ESTROGEN RECEPTOR POSITIVE (HCC): Primary | ICD-10-CM

## 2022-09-30 PROCEDURE — 99213 OFFICE O/P EST LOW 20 MIN: CPT | Performed by: INTERNAL MEDICINE

## 2022-09-30 RX ORDER — LETROZOLE 2.5 MG/1
2.5 TABLET, FILM COATED ORAL DAILY
Qty: 90 TABLET | Refills: 3 | Status: SHIPPED | OUTPATIENT
Start: 2022-09-30

## 2022-09-30 NOTE — PROGRESS NOTES
Pt here for follow up. She is taking letrozole. No new complaints.     Outpatient Oncology Care Plan  Problem list:  knowledge deficit    Problems related to:    disease/disease progression    Interventions:  provided general teaching    Expected outcomes:  understands plan of care    Progress towards outcome:  making progress    Education Record    Learner:  Patient  Barriers / Limitations:  None  Method:  Brief focused  Outcome:  Shows understanding  Comments:

## 2023-04-07 ENCOUNTER — OFFICE VISIT (OUTPATIENT)
Dept: HEMATOLOGY/ONCOLOGY | Age: 88
End: 2023-04-07
Attending: INTERNAL MEDICINE
Payer: MEDICARE

## 2023-04-07 VITALS
SYSTOLIC BLOOD PRESSURE: 205 MMHG | TEMPERATURE: 97 F | DIASTOLIC BLOOD PRESSURE: 87 MMHG | HEART RATE: 45 BPM | OXYGEN SATURATION: 97 % | HEIGHT: 64.02 IN | RESPIRATION RATE: 18 BRPM | BODY MASS INDEX: 27.68 KG/M2 | WEIGHT: 162.13 LBS

## 2023-04-07 DIAGNOSIS — C50.812 CARCINOMA OF OVERLAPPING SITES OF LEFT BREAST IN FEMALE, ESTROGEN RECEPTOR POSITIVE (HCC): Primary | ICD-10-CM

## 2023-04-07 DIAGNOSIS — Z17.0 CARCINOMA OF OVERLAPPING SITES OF LEFT BREAST IN FEMALE, ESTROGEN RECEPTOR POSITIVE (HCC): Primary | ICD-10-CM

## 2023-04-07 PROCEDURE — 99213 OFFICE O/P EST LOW 20 MIN: CPT | Performed by: INTERNAL MEDICINE

## 2023-04-07 NOTE — PROGRESS NOTES
Patient is here for follow up for breast cancer on letrozole. She is tolerating this without any side effects. Her energy is variable, she rests more. She is eating well. She denies any fever, cough or shortness of breath, unless she climbs a lot of stairs.      Education Record    Learner:  Patient    Disease / Diagnosis: Breast cancer    Barriers / Limitations:  None   Comments:    Method:  Discussion   Comments:    General Topics:  Side effects and symptom management   Comments:    Outcome:  Shows understanding   Comments:

## 2023-07-18 ENCOUNTER — LAB ENCOUNTER (OUTPATIENT)
Dept: LAB | Facility: HOSPITAL | Age: 88
End: 2023-07-18
Attending: INTERNAL MEDICINE
Payer: MEDICARE

## 2023-07-18 ENCOUNTER — HOSPITAL ENCOUNTER (OUTPATIENT)
Dept: GENERAL RADIOLOGY | Facility: HOSPITAL | Age: 88
Discharge: HOME | End: 2023-07-18
Attending: INTERNAL MEDICINE
Payer: MEDICARE

## 2023-07-18 DIAGNOSIS — Z01.818 PREOP EXAMINATION: ICD-10-CM

## 2023-07-18 DIAGNOSIS — Z01.818 PREOPERATIVE TESTING: Primary | ICD-10-CM

## 2023-07-18 LAB
ALBUMIN SERPL-MCNC: 3.6 G/DL (ref 3.4–5)
ALBUMIN/GLOB SERPL: 1 {RATIO} (ref 1–2)
ALP LIVER SERPL-CCNC: 68 U/L
ALT SERPL-CCNC: 24 U/L
ANION GAP SERPL CALC-SCNC: 4 MMOL/L (ref 0–18)
AST SERPL-CCNC: 24 U/L (ref 15–37)
BASOPHILS # BLD AUTO: 0.04 X10(3) UL (ref 0–0.2)
BASOPHILS NFR BLD AUTO: 0.5 %
BILIRUB SERPL-MCNC: 0.5 MG/DL (ref 0.1–2)
BUN BLD-MCNC: 24 MG/DL (ref 7–18)
BUN/CREAT SERPL: 24.2 (ref 10–20)
CALCIUM BLD-MCNC: 9.6 MG/DL (ref 8.5–10.1)
CHLORIDE SERPL-SCNC: 106 MMOL/L (ref 98–112)
CO2 SERPL-SCNC: 31 MMOL/L (ref 21–32)
CREAT BLD-MCNC: 0.99 MG/DL
DEPRECATED RDW RBC AUTO: 46.6 FL (ref 35.1–46.3)
EOSINOPHIL # BLD AUTO: 0.39 X10(3) UL (ref 0–0.7)
EOSINOPHIL NFR BLD AUTO: 5 %
ERYTHROCYTE [DISTWIDTH] IN BLOOD BY AUTOMATED COUNT: 13.9 % (ref 11–15)
FASTING STATUS PATIENT QL REPORTED: YES
GFR SERPLBLD BASED ON 1.73 SQ M-ARVRAT: 53 ML/MIN/1.73M2 (ref 60–?)
GLOBULIN PLAS-MCNC: 3.5 G/DL (ref 2.8–4.4)
GLUCOSE BLD-MCNC: 91 MG/DL (ref 70–99)
HCT VFR BLD AUTO: 47.2 %
HGB BLD-MCNC: 15.1 G/DL
IMM GRANULOCYTES # BLD AUTO: 0.02 X10(3) UL (ref 0–1)
IMM GRANULOCYTES NFR BLD: 0.3 %
LYMPHOCYTES # BLD AUTO: 2.25 X10(3) UL (ref 1–4)
LYMPHOCYTES NFR BLD AUTO: 28.7 %
MCH RBC QN AUTO: 28.8 PG (ref 26–34)
MCHC RBC AUTO-ENTMCNC: 32 G/DL (ref 31–37)
MCV RBC AUTO: 90.1 FL
MONOCYTES # BLD AUTO: 0.41 X10(3) UL (ref 0.1–1)
MONOCYTES NFR BLD AUTO: 5.2 %
MRSA DNA SPEC QL NAA+PROBE: NEGATIVE
NEUTROPHILS # BLD AUTO: 4.74 X10 (3) UL (ref 1.5–7.7)
NEUTROPHILS # BLD AUTO: 4.74 X10(3) UL (ref 1.5–7.7)
NEUTROPHILS NFR BLD AUTO: 60.3 %
OSMOLALITY SERPL CALC.SUM OF ELEC: 296 MOSM/KG (ref 275–295)
PLATELET # BLD AUTO: 196 10(3)UL (ref 150–450)
POTASSIUM SERPL-SCNC: 4.4 MMOL/L (ref 3.5–5.1)
PROT SERPL-MCNC: 7.1 G/DL (ref 6.4–8.2)
RBC # BLD AUTO: 5.24 X10(6)UL
SODIUM SERPL-SCNC: 141 MMOL/L (ref 136–145)
WBC # BLD AUTO: 7.9 X10(3) UL (ref 4–11)

## 2023-07-18 PROCEDURE — 80053 COMPREHEN METABOLIC PANEL: CPT

## 2023-07-18 PROCEDURE — 71046 X-RAY EXAM CHEST 2 VIEWS: CPT | Performed by: INTERNAL MEDICINE

## 2023-07-18 PROCEDURE — 85025 COMPLETE CBC W/AUTO DIFF WBC: CPT

## 2023-07-18 PROCEDURE — 87641 MR-STAPH DNA AMP PROBE: CPT

## 2023-07-18 PROCEDURE — 36415 COLL VENOUS BLD VENIPUNCTURE: CPT

## 2023-07-20 RX ORDER — AMLODIPINE BESYLATE 2.5 MG/1
2.5 TABLET ORAL DAILY
COMMUNITY
Start: 2023-06-28

## 2023-07-20 NOTE — H&P
[facility]    Cardiac Electrophysiology H&P Addendum    [facility]   [facility] [id number] MRN [id number]   Admission Date 7/28/2023 Procedure Date 7/28/2023   Attending Physician Sanjay Yap MD Procedure Physician Marii Warren MD       H&P ADDENDUM    I personally reviewed the attached H&P on 7/28/2023, and no significant changes have occurred in the patient's condition since the H&P was performed. Risks, alternatives, and benefits of procedure were again reviewed at bedside with the patient. They have no additional questions and wish to proceed. Marii Warren M.D.    Cardiac Electrophysiology       -----------------------------------------

## 2023-07-28 ENCOUNTER — APPOINTMENT (OUTPATIENT)
Dept: GENERAL RADIOLOGY | Facility: HOSPITAL | Age: 88
End: 2023-07-28
Attending: INTERNAL MEDICINE
Payer: MEDICARE

## 2023-07-28 ENCOUNTER — HOSPITAL ENCOUNTER (OUTPATIENT)
Dept: INTERVENTIONAL RADIOLOGY/VASCULAR | Facility: HOSPITAL | Age: 88
Discharge: HOME | End: 2023-07-28
Attending: INTERNAL MEDICINE | Admitting: INTERNAL MEDICINE
Payer: MEDICARE

## 2023-07-28 VITALS
WEIGHT: 161 LBS | HEART RATE: 60 BPM | OXYGEN SATURATION: 97 % | RESPIRATION RATE: 16 BRPM | BODY MASS INDEX: 28.53 KG/M2 | TEMPERATURE: 98 F | HEIGHT: 63 IN | DIASTOLIC BLOOD PRESSURE: 76 MMHG | SYSTOLIC BLOOD PRESSURE: 146 MMHG

## 2023-07-28 DIAGNOSIS — I44.1 2ND DEGREE AV BLOCK: ICD-10-CM

## 2023-07-28 DIAGNOSIS — I45.4 BBB (BUNDLE BRANCH BLOCK): ICD-10-CM

## 2023-07-28 PROCEDURE — 99153 MOD SED SAME PHYS/QHP EA: CPT | Performed by: INTERNAL MEDICINE

## 2023-07-28 PROCEDURE — 0JH606Z INSERTION OF PACEMAKER, DUAL CHAMBER INTO CHEST SUBCUTANEOUS TISSUE AND FASCIA, OPEN APPROACH: ICD-10-PCS | Performed by: INTERNAL MEDICINE

## 2023-07-28 PROCEDURE — 99152 MOD SED SAME PHYS/QHP 5/>YRS: CPT | Performed by: INTERNAL MEDICINE

## 2023-07-28 PROCEDURE — 02H63JZ INSERTION OF PACEMAKER LEAD INTO RIGHT ATRIUM, PERCUTANEOUS APPROACH: ICD-10-PCS | Performed by: INTERNAL MEDICINE

## 2023-07-28 PROCEDURE — 33208 INSRT HEART PM ATRIAL & VENT: CPT | Performed by: INTERNAL MEDICINE

## 2023-07-28 PROCEDURE — 02HK3JZ INSERTION OF PACEMAKER LEAD INTO RIGHT VENTRICLE, PERCUTANEOUS APPROACH: ICD-10-PCS | Performed by: INTERNAL MEDICINE

## 2023-07-28 PROCEDURE — 71045 X-RAY EXAM CHEST 1 VIEW: CPT | Performed by: INTERNAL MEDICINE

## 2023-07-28 RX ORDER — ACETAMINOPHEN AND CODEINE PHOSPHATE 300; 30 MG/1; MG/1
1 TABLET ORAL EVERY 4 HOURS PRN
Status: DISCONTINUED | OUTPATIENT
Start: 2023-07-28 | End: 2023-07-28

## 2023-07-28 RX ORDER — SODIUM CHLORIDE 9 MG/ML
INJECTION, SOLUTION INTRAVENOUS
Status: COMPLETED | OUTPATIENT
Start: 2023-07-28 | End: 2023-07-28

## 2023-07-28 RX ORDER — ACETAMINOPHEN 325 MG/1
650 TABLET ORAL EVERY 4 HOURS PRN
Status: DISCONTINUED | OUTPATIENT
Start: 2023-07-28 | End: 2023-07-28

## 2023-07-28 RX ORDER — ACETAMINOPHEN AND CODEINE PHOSPHATE 300; 30 MG/1; MG/1
2 TABLET ORAL EVERY 4 HOURS PRN
Status: DISCONTINUED | OUTPATIENT
Start: 2023-07-28 | End: 2023-07-28

## 2023-07-28 RX ORDER — CLINDAMYCIN HYDROCHLORIDE 300 MG/1
600 CAPSULE ORAL 3 TIMES DAILY
Qty: 6 CAPSULE | Refills: 0 | Status: SHIPPED | OUTPATIENT
Start: 2023-07-28 | End: 2023-07-29

## 2023-07-28 RX ORDER — CLINDAMYCIN PHOSPHATE 900 MG/50ML
INJECTION, SOLUTION INTRAVENOUS
Status: COMPLETED
Start: 2023-07-28 | End: 2023-07-28

## 2023-07-28 RX ORDER — MIDAZOLAM HYDROCHLORIDE 1 MG/ML
INJECTION INTRAMUSCULAR; INTRAVENOUS
Status: COMPLETED
Start: 2023-07-28 | End: 2023-07-28

## 2023-07-28 RX ORDER — LIDOCAINE HYDROCHLORIDE AND EPINEPHRINE 10; 10 MG/ML; UG/ML
INJECTION, SOLUTION INFILTRATION; PERINEURAL
Status: COMPLETED
Start: 2023-07-28 | End: 2023-07-28

## 2023-07-28 RX ORDER — CLINDAMYCIN HYDROCHLORIDE 300 MG/1
600 CAPSULE ORAL 3 TIMES DAILY
Qty: 6 CAPSULE | Refills: 0 | Status: SHIPPED | OUTPATIENT
Start: 2023-07-28 | End: 2023-07-28

## 2023-07-28 RX ADMIN — SODIUM CHLORIDE: 9 INJECTION, SOLUTION INTRAVENOUS at 07:30:00

## 2023-07-28 NOTE — OPERATIVE REPORT
[facility]    Cardiac Electrophysiology Operative Note    [facility]   [facility] [id number] MRN [id number]   Admission Date 7/28/2023 Procedure Date 7/28/2023   Attending Physician Liliana Murray MD Procedure Physician Jennifer Gao MD       Preprocedure Diagnosis:  Sinus node dysfunction     Postprocedure Diagnosis:  Sinus node dysfunction     Procedures:    - Implantation of a MRI-conditional dual-chamber permanent pacemaker, right atrial lead, and right ventricular lead [facility]  - Fluoroscopy   - Venography   - Moderate conscious sedation     :  Jennifer Gao M.D. Anesthesia:  I provided moderate conscious sedation from 08:07 to 08:39. Versed, fentanyl. Estimated blood loss:  10 mL      Complications:  None apparent. Findings:  The patient was brought to the operating room in the postabsorptive and stable state. A procedural pause was performed to confirm the patient's identity and the site and type of surgery. Intravenous conscious sedation was administered. The chest was prepped and draped in a sterile fashion. 1% lidocaine was administered in the left chest, and an incision was made medial to the deltopectoral groove. Electrocautery was used to create a small inferomedially directed prepectoral pocket. The left axillary vein was accessed using venography, fluoroscopy, and the modified Seldinger technique. Two J-tipped guide wires were advanced into the inferior vena cava. Over the first wire, a 6-Austrian sheath was advanced into the axillary vein, and through this the right ventricular lead was advanced to the right ventricular septum, confirmed in the Welsh and ORONA fluoroscopic perspectives. Lead stability and electrical parameters were satisfactory, no extracardiac stimulation at maximum output, and the lead was secured to the pectoralis muscle using 3 separate ties of 0-ethibond suture and the collar.   The next wire was used to advance a 6-Luxembourger sheath into the axillary vein, through which the right atrial lead was advanced to the right atrial appendage. Lead stability and electrical parameters were satisfactory, with no extracardiac stimulation at maximum output, and the lead was secured to the pectoralis muscle using 3 separate ties of 0-ethibond suture and the collar. The generator was connected to the leads, with visual inspection and gentle tugging confirming a secure connection. The device pocket was irrigated with sterile solution, and hemostasis was excellent. The device was placed within the pocket, and it was closed with a deep layer of 2-0 Vicryl suture, an intermediate layer of 2-0 Vicryl suture, and the subcuticular layer was approximated with a Zipline device, with a sterile bandage placed over the site. The pacemaker was interrogated via the external , with satisfactory findings. The patient was transported to the recovery     Device Settings:   DDDR  ppm         RESULTS:   - Implantation of a MRI-conditional dual-chamber pacemaker      PLAN:   - Portable CXR in recovery  - Remote device interrogation in AM  - No heparin, lovenox, or other anticoagulants for 48 hours. - Incision care as directed. Keep dry for 5 days.  - Follow-up in [address] [place] [address] for incision check in 7-10 days.  - See me in 4 weeks to remove Zipline.  - Arm restrictions for 3 months, with sling at night for 1 month. - Driving/activity restrictions as instructed. - Oral antibioticvs x 24 hours (script sent to pharmacy)      Zi Sullivan M.D.    Cardiac Electrophysiology     7/28/2023  -----------------------------------------

## 2023-07-28 NOTE — DISCHARGE INSTRUCTIONS
[facility] Discharge Instructions - Pacemaker or ICD Placement     1. Your appointment for a wound check is on 8/4/23  with Juan F Reyes at 11:00 am.     2.  At that appointment, they will make an appointment with Dr Juan F Reyes in 4 weeks to remove the Zipline dressing, and also an appointment with the [address] [place] [address] for 3 months after implantation of your device. 3.  Please call [phone] if any questions about the follow-up appointments. 4. Remove top dressing in 24 hours and leave Zipline dressing open to air. Do not get the incision site wet. 5.You may shower in 5 days. Blot the incision area gently with a towel. The Zipline dressing will remain in place until your office visit  in 4 weeks. 6. For surgical site pain, you may take extra strength tylenol (500mg), 1 or 2 tabs/caps every 4-6 hours as needed. Do not exceed maximum of 6 tabs/caps in 24 hours. 7. No strenuous exercises until cleared by your physician. Do NOT lift anything greater than 5-8 pounds for 1 month. 8. You may NOT reach or stretch your LEFT arm for 1 month. Keep your elbow below the level of the shoulder and you may only raise your elbow to shoulder level. 9. Wear your sling for 24 hours, then when in bed at night for 4 weeks. 10. No repeated arm movements such as swimming for 3 months. 11. Carry your ID Card at all times. 12. Screening mammography SHOULD NOT BE DONE until 6 months after device implant. Please consult with your ordering physician if diagnostic mammography is needed prior to 6 months from device implant. 15. Inform your doctors, dentists, and emergency personnel that you have a implanted cardiac device. 14. Do not drive until cleared by your physician. .      15. No alcohol, working, or critical decision making today.      16. Oral antibiotics will need to be taken for 24 hours      Notify your doctor if:    You have shortness of breath or persistent cough  Chest pain  Persistent pain on the site   Fever (temperature greater than 101F) chills, infection (redness, swelling, or yellow drainage to the site)

## 2023-07-28 NOTE — IVS NOTE
DISCHARGE NOTE    1, PATIENT AWAKE AND ALERT X 4    2. DIMAS, VSS, NO PONV, NO PAIN    3. INSTRUCTIONS GIVEN TO PATIENT AND FAMILY    4. IV ACCESS WAS REMOVED BEFORE DISCHARGE    5. DR. Dora Barron         SPOKE WITH PATIENT AND FAMILY POST PROCEDURE    6. PATIENT ABLE TO Eat, DRINK, AMBULATE, VOID    7. PROCEDURAL SITE REMAINS DRY, INTACT WITH GOOD CIRCULATION,    MOVEMENT AND SENSATION    8. Inderjit Pedersen    9. PATIENT DISCHARGED VIA WHEEL CHAIR TO main entrance    10.  NEW PRESCRIPTION: clindamycin

## 2023-10-06 ENCOUNTER — APPOINTMENT (OUTPATIENT)
Dept: HEMATOLOGY/ONCOLOGY | Age: 88
End: 2023-10-06
Attending: INTERNAL MEDICINE
Payer: MEDICARE

## 2023-10-13 ENCOUNTER — APPOINTMENT (OUTPATIENT)
Dept: HEMATOLOGY/ONCOLOGY | Age: 88
End: 2023-10-13
Attending: INTERNAL MEDICINE
Payer: MEDICARE

## 2023-10-27 ENCOUNTER — OFFICE VISIT (OUTPATIENT)
Dept: HEMATOLOGY/ONCOLOGY | Age: 88
End: 2023-10-27
Attending: INTERNAL MEDICINE
Payer: MEDICARE

## 2023-10-27 VITALS
HEIGHT: 64.02 IN | DIASTOLIC BLOOD PRESSURE: 78 MMHG | HEART RATE: 68 BPM | BODY MASS INDEX: 27.66 KG/M2 | OXYGEN SATURATION: 98 % | TEMPERATURE: 96 F | SYSTOLIC BLOOD PRESSURE: 170 MMHG | RESPIRATION RATE: 18 BRPM | WEIGHT: 162 LBS

## 2023-10-27 DIAGNOSIS — C50.812 CARCINOMA OF OVERLAPPING SITES OF LEFT BREAST IN FEMALE, ESTROGEN RECEPTOR POSITIVE: Primary | ICD-10-CM

## 2023-10-27 DIAGNOSIS — Z17.0 CARCINOMA OF OVERLAPPING SITES OF LEFT BREAST IN FEMALE, ESTROGEN RECEPTOR POSITIVE: Primary | ICD-10-CM

## 2023-10-27 PROCEDURE — 99213 OFFICE O/P EST LOW 20 MIN: CPT | Performed by: INTERNAL MEDICINE

## 2023-10-27 NOTE — PROGRESS NOTES
Cancer Center Progress Note    Problem List:      Patient Active Problem List:     Carcinoma of overlapping sites of left breast in female, estrogen receptor positive      Osteoporosis     Osteoarthrosis     Encounter for monitoring cardiotoxic drug therapy     Neoplasm of right breast, regional lymph node staging category pN0 per American Joint Committee on Cancer Staging Guidelines, 7th edition [facility])      Interim History:    Joya Duval presents today for evaluation and management of a diagnosis of bilateral breast cancer. She completed a year of trastuzumab and pertuzumab on 2/28/2019. She has been on Letrozole daily until last visit six months ago. She now has stopped the AI. She is feeling much better since last visit. She has no bone pain. She has no dyspnea or cough now. She has a good appetite. She has no fever or sweats. She completed radiation to the left chest wall in 9/2018. She has HER2 positive disease in the left breast with a 6 cm primary cancer and 1 of 7 nodes positive. She has some persistent chest pain. She has no other new complaints. She has no fever or sweats. Review of Systems:   Constitutional: Negative for anorexia, fevers, chills, night sweats and weight loss. Psychiatric: The patient's mood was calm and appropriate for this visit. The pertinent positives and negatives were described. All other systems were negative.     PMH/PSH:  Past Medical History:   Diagnosis Date    Breast cancer ([address]     High blood pressure        Past Surgical History:   Procedure Laterality Date    CATARACT      HYSTERECTOMY  1975    partial    KNEE REPLACEMENT SURGERY  2008    R knee    KNEE REPLACEMENT SURGERY  2011    L knee    MASTECTOMY LEFT      MASTECTOMY RIGHT      TONSILLECTOMY         Family History Reviewed:  Family History   Problem Relation Age of Onset    Cancer Paternal Grandmother 80        Breast     Cancer Brother 79        Prostate        Allergies:       Penicillins Tetanus Toxoids           Gluten Meal             DIARRHEA    Medications:  amLODIPine 2.5 MG Oral Tab, Take 1 tablet (2.5 mg total) by mouth daily. , Disp: , Rfl:   dicyclomine 10 MG Oral Cap, Take 1 capsule (10 mg total) by mouth 3 (three) times daily. , Disp: , Rfl:   Vitamin C 500 MG Oral Tab, Take 1 tablet (500 mg total) by mouth daily. , Disp: , Rfl:   CALCIUM OR, Take 600 mg by mouth daily. , Disp: , Rfl:   loratadine 10 MG Oral Tab, Take 1 tablet (10 mg total) by mouth daily as needed. , Disp: , Rfl:   Misc Natural Products (GLUCOSAMINE CHOND COMPLEX/MSM OR), Take by mouth daily. , Disp: , Rfl:   Multiple Vitamin (MULTI-VITAMIN) Oral Tab, Take by mouth daily. , Disp: , Rfl:   Multiple Vitamins-Minerals (EYE VITAMINS OR), Take by mouth daily. , Disp: , Rfl:         Performance Status:  ECOG 0: Fully active, able to carry on all pre-disease performance without restriction     Physical Examination:    Constitutional: Patient is alert and not in acute distress. Respiratory: Clear to auscultation and percussion. No rales. No wheezes. Cardiovascular: Regular rate and rhythm. No murmurs. Chest: No mass or skin lesion suspicious of cancer. Gastrointestinal: Soft, non tender with good bowel sounds. Musculoskeletal: No edema. No calf tenderness. Skin: No suspicious skin lesion, no rash, no ulceration. Lymphatics: There is no palpable lymphadenopathy throughout in the cervical, supraclavicular, or axillary regions. Psychiatric: The patient's mood is calm and appropriate for this visit.        Labs reviewed at this visi    Lab Results   Component Value Date    WBC 7.9 07/18/2023    RBC 5.24 07/18/2023    HGB 15.1 07/18/2023    HCT 47.2 07/18/2023    MCV 90.1 07/18/2023    MCH 28.8 07/18/2023    MCHC 32.0 07/18/2023    RDW 13.9 07/18/2023    .0 07/18/2023     Lab Results   Component Value Date     07/18/2023    K 4.4 07/18/2023     07/18/2023    CO2 31.0 07/18/2023    BUN 24 (H) 07/18/2023 CREATSERUM 0.99 07/18/2023    GLU 91 07/18/2023    CA 9.6 07/18/2023    ALKPHO 68 07/18/2023    ALT 24 07/18/2023    AST 24 07/18/2023    BILT 0.5 07/18/2023    ALB 3.6 07/18/2023    TP 7.1 07/18/2023       Radiologic imaging reviewed at this visit:    Dexa scan on 7/24/2020:  LUMBAR SPINE ANALYSIS RESULTS:      Bone mineral density (BMD) (g/cm2):  0.924    Lumbar T-Score:  -0.9      % young normals:  91      % age matched controls:  130      Change from prior spine examination:  N/A  This places patient at [employer] [facility] classification of normal.               TOTAL HIP ANALYSIS RESULTS:        Bone mineral density (BMD) (g/cm2):  0.799      Total Hip T-Score:  -1.2      % young normals:  85      % age matched controls:  80      Change from prior hip examination:  N/A  This places patient at [facility]) classification of osteopenia. FEMORAL NECK ANALYSIS RESULTS:        Bone mineral density (BMD) (g/cm2):  0.656      Femoral neck T-Score:  -1.7      % young normals:  77      % age matched controls:  80      Change from prior hip examination:  N/A      This places patient at [facility]) classification of osteopenia     FRAX - WHO Fracture Risk Assessment Tool  This places patient at 10 year fracture risk at: Major Osteoporotic Fracture: 18 %  Hip Fracture: 5.0 %. ADDITIONAL FINDINGS:  No significant additional findings.       =====  CONCLUSION:  WHO Classification as described above. Assessment/Plan:     Bilateral breast cancer:  Left breast cancer in overlapping quadrants:  HER2 positive  6 cm cancer with 1/7 nodes positive (extranodal extension)  Right breast cancer, low grade  ER/MA positive  HER2 2+\=  Taxol/Herceptin/Pertuzumab x 12 started on 2/23/2018  Completed in 2/2019  Left chest wall radiation completed in 9/2018  Letrozole started in 7/2018 and stopped in 7/2023     She is doing well with YEMI.  She has decided to stop the AI. She now will follow at about 12 month intervals. I will see her in the summer and then every 12 months. She will call with any new complaints prior to the visit. Osteopenia:    Continue calcium and vit d. Would recommend PCP management now that she has stopped the AI.     Anamaria Miguel MD

## 2023-10-27 NOTE — PROGRESS NOTES
Patient is here for follow up for breast cancer. She had a pacemaker placed on July 28. She denies any shortness of breath. She reports that she is eating well. She denies any fever or cough. She is not having any pain. She has macular degeneration and no longer drives.       Education Record    Learner:  Patient and Family Member    Disease / Diagnosis: Breast cancer    Barriers / Limitations:  None   Comments:    Method:  Discussion   Comments:    General Topics:  Side effects and symptom management   Comments:    Outcome:  Shows understanding   Comments:

## 2024-06-13 ENCOUNTER — APPOINTMENT (OUTPATIENT)
Dept: HEMATOLOGY/ONCOLOGY | Age: 89
End: 2024-06-13
Attending: INTERNAL MEDICINE
Payer: MEDICARE

## 2024-06-14 ENCOUNTER — OFFICE VISIT (OUTPATIENT)
Dept: HEMATOLOGY/ONCOLOGY | Age: 89
End: 2024-06-14
Attending: INTERNAL MEDICINE
Payer: MEDICARE

## 2024-06-14 VITALS
BODY MASS INDEX: 27.23 KG/M2 | SYSTOLIC BLOOD PRESSURE: 169 MMHG | RESPIRATION RATE: 18 BRPM | TEMPERATURE: 97 F | DIASTOLIC BLOOD PRESSURE: 82 MMHG | HEART RATE: 74 BPM | WEIGHT: 159.5 LBS | HEIGHT: 64.02 IN | OXYGEN SATURATION: 96 %

## 2024-06-14 DIAGNOSIS — Z17.0 CARCINOMA OF OVERLAPPING SITES OF LEFT BREAST IN FEMALE, ESTROGEN RECEPTOR POSITIVE (HCC): Primary | ICD-10-CM

## 2024-06-14 DIAGNOSIS — C50.812 CARCINOMA OF OVERLAPPING SITES OF LEFT BREAST IN FEMALE, ESTROGEN RECEPTOR POSITIVE (HCC): Primary | ICD-10-CM

## 2024-06-14 PROCEDURE — 99213 OFFICE O/P EST LOW 20 MIN: CPT | Performed by: INTERNAL MEDICINE

## 2024-06-14 NOTE — PROGRESS NOTES
Patient here for 8 month f/u for breast cancer. Patient denies pain. Patient has good appetite and adequate fluid intake. Patient has no further concerns or complaints at this time.     Education Record    Learner:  Patient and Family Member    Disease / Diagnosis: history of breast cancer     Barriers / Limitations:  None   Comments:    Method:  Discussion   Comments:    General Topics:  Medication, Side effects and symptom management, and Plan of care reviewed   Comments:    Outcome:  Shows understanding   Comments:

## 2024-06-14 NOTE — PROGRESS NOTES
Cancer Center Progress Note    Problem List:      Patient Active Problem List   Diagnosis    Carcinoma of overlapping sites of left breast in female, estrogen receptor positive (HCC)    Osteoporosis    Osteoarthrosis    Encounter for monitoring cardiotoxic drug therapy    Neoplasm of right breast, regional lymph node staging category pN0 per American Joint Committee on Cancer Staging Guidelines, 7th edition (HCC)       Interim History:    Zayra Francois presents today for evaluation and management of a diagnosis of bilateral breast cancer.    She completed a year of trastuzumab and pertuzumab on 2/28/2019. She has been on Letrozole daily until last visit six months ago. She now has stopped the AI.  She is feeling much better since last visit. She has no bone pain. She has no dyspnea or cough now. She has a good appetite. She has no fever or sweats.    She has had vision lost and now has been determined to be legally blind.     She completed radiation to the left chest wall in 9/2018.     She has HER2 positive disease in the left breast with a 6 cm primary cancer and 1 of 7 nodes positive. She has some persistent chest pain. She has no other new complaints. She has no fever or sweats.       Review of Systems:   Constitutional: Negative for anorexia, fevers, chills, night sweats and weight loss.  Psychiatric: The patient's mood was calm and appropriate for this visit.  The pertinent positives and negatives were described. All other systems were negative.    PMH/PSH:  Past Medical History:    Breast cancer (HCC)    High blood pressure       Past Surgical History:   Procedure Laterality Date    Cataract      Hysterectomy  1975    partial    Knee replacement surgery  2008    R knee    Knee replacement surgery  2011    L knee    Mastectomy left      Mastectomy right      Tonsillectomy         Family History Reviewed:  Family History   Problem Relation Age of Onset    Cancer Paternal Grandmother 83        Breast      Cancer Brother 70        Prostate        Allergies:     Allergies   Allergen Reactions    Penicillins     Tetanus Toxoids     Gluten Meal DIARRHEA       Medications:   amLODIPine 2.5 MG Oral Tab Take 1 tablet (2.5 mg total) by mouth daily.      dicyclomine 10 MG Oral Cap Take 1 capsule (10 mg total) by mouth 3 (three) times daily.      Vitamin C 500 MG Oral Tab Take 1 tablet (500 mg total) by mouth daily.      CALCIUM OR Take 600 mg by mouth daily.      loratadine 10 MG Oral Tab Take 1 tablet (10 mg total) by mouth daily as needed.      Misc Natural Products (GLUCOSAMINE CHOND COMPLEX/MSM OR) Take by mouth daily.      Multiple Vitamin (MULTI-VITAMIN) Oral Tab Take by mouth daily.      Multiple Vitamins-Minerals (EYE VITAMINS OR) Take by mouth daily.         Performance Status:  ECOG 0: Fully active, able to carry on all pre-disease performance without restriction     Physical Examination:    Constitutional: Patient is alert and not in acute distress.  Respiratory: Clear to auscultation and percussion. No rales.  No wheezes.  Cardiovascular: Regular rate and rhythm. No murmurs.  Chest: No mass or skin lesion suspicious of cancer.  Gastrointestinal: Soft, non tender with good bowel sounds.  Musculoskeletal: No edema. No calf tenderness.  Skin: No suspicious skin lesion, no rash, no ulceration.  Lymphatics: There is no palpable lymphadenopathy throughout in the cervical, supraclavicular, or axillary regions.  Psychiatric: The patient's mood is calm and appropriate for this visit.       Labs reviewed at this visi    Lab Results   Component Value Date    WBC 7.9 07/18/2023    RBC 5.24 07/18/2023    HGB 15.1 07/18/2023    HCT 47.2 07/18/2023    MCV 90.1 07/18/2023    MCH 28.8 07/18/2023    MCHC 32.0 07/18/2023    RDW 13.9 07/18/2023    .0 07/18/2023     Lab Results   Component Value Date     07/18/2023    K 4.4 07/18/2023     07/18/2023    CO2 31.0 07/18/2023    BUN 24 (H) 07/18/2023    CREATSERUM 0.99  07/18/2023    GLU 91 07/18/2023    CA 9.6 07/18/2023    ALKPHO 68 07/18/2023    ALT 24 07/18/2023    AST 24 07/18/2023    BILT 0.5 07/18/2023    ALB 3.6 07/18/2023    TP 7.1 07/18/2023       Radiologic imaging reviewed at this visit:    Dexa scan on 7/24/2020:  LUMBAR SPINE ANALYSIS RESULTS:      Bone mineral density (BMD) (g/cm2):  0.924    Lumbar T-Score:  -0.9      % young normals:  91      % age matched controls:  130      Change from prior spine examination:  N/A  This places patient at World Health Organization (WHO) classification of normal.               TOTAL HIP ANALYSIS RESULTS:        Bone mineral density (BMD) (g/cm2):  0.799      Total Hip T-Score:  -1.2      % young normals:  85      % age matched controls:  122      Change from prior hip examination:  N/A  This places patient at World Health Organization (WHO) classification of osteopenia.               FEMORAL NECK ANALYSIS RESULTS:        Bone mineral density (BMD) (g/cm2):  0.656      Femoral neck T-Score:  -1.7      % young normals:  77      % age matched controls:  115      Change from prior hip examination:  N/A      This places patient at World Health Organization (WHO) classification of osteopenia     FRAX - WHO Fracture Risk Assessment Tool  This places patient at 10 year fracture risk at:  Major Osteoporotic Fracture: 18 %  Hip Fracture: 5.0 %.     ADDITIONAL FINDINGS:  No significant additional findings.       =====  CONCLUSION:  WHO Classification as described above.     Assessment/Plan:     Bilateral breast cancer:  Left breast cancer in overlapping quadrants:  HER2 positive  6 cm cancer with 1/7 nodes positive (extranodal extension)  Right breast cancer, low grade  ER/DE positive  HER2 2+\=  Taxol/Herceptin/Pertuzumab x 12 started on 2/23/2018  Completed in 2/2019  Left chest wall radiation completed in 9/2018  Letrozole started in 7/2018 and stopped in 7/2023     She is doing well with YEMI. She has decided to stop the AI. She now will  follow at about 12 month intervals. I will see her in the summer and then every 12 months. She will call with any new complaints prior to the visit.    Osteopenia:    Continue calcium and vit d. Would recommend PCP management now that she has stopped the AI.    Jose A Brito MD

## 2025-06-27 ENCOUNTER — OFFICE VISIT (OUTPATIENT)
Age: OVER 89
End: 2025-06-27
Attending: INTERNAL MEDICINE
Payer: MEDICARE

## 2025-06-27 VITALS
HEIGHT: 64.02 IN | SYSTOLIC BLOOD PRESSURE: 179 MMHG | OXYGEN SATURATION: 95 % | RESPIRATION RATE: 16 BRPM | DIASTOLIC BLOOD PRESSURE: 82 MMHG | BODY MASS INDEX: 28.79 KG/M2 | TEMPERATURE: 98 F | HEART RATE: 69 BPM | WEIGHT: 168.63 LBS

## 2025-06-27 DIAGNOSIS — Z17.0 CARCINOMA OF OVERLAPPING SITES OF LEFT BREAST IN FEMALE, ESTROGEN RECEPTOR POSITIVE (HCC): Primary | ICD-10-CM

## 2025-06-27 DIAGNOSIS — C50.812 CARCINOMA OF OVERLAPPING SITES OF LEFT BREAST IN FEMALE, ESTROGEN RECEPTOR POSITIVE (HCC): Primary | ICD-10-CM

## 2025-06-27 RX ORDER — METOPROLOL SUCCINATE 25 MG/1
25 TABLET, EXTENDED RELEASE ORAL DAILY
COMMUNITY

## 2025-06-27 NOTE — PROGRESS NOTES
Patient here for 1 year f/u for h/o breast cancer. Patient has no current complaints or concerns at this time. Patient will have chest wall exam. Patient accompanied by her daughter Nehal.     Education Record    Learner:  Patient and Family Member daughter Nehal    Disease / Diagnosis: breast cancer    Barriers / Limitations:  None   Comments:    Method:  Discussion   Comments:    General Topics:  Medication, Side effects and symptom management, and Plan of care reviewed   Comments:    Outcome:  Shows understanding   Comments:

## 2025-06-27 NOTE — PROGRESS NOTES
Cancer Center Progress Note    Problem List:      Problem List[1]      History of Present Illness  Zayra Francois is a 89 year old female with bilateral breast cancer who presents for routine yearly follow-up.    She has a history of bilateral breast cancer, with the left breast being HER2 positive and one of seven lymph nodes positive, measuring 0.6 centimeters. The right breast had a low-grade estrogen receptor positive tumor that was HER2 negative. She underwent treatment with Taxol, Herceptin, and pertuzumab from February 2018 to February 2019, and completed left chest wall radiation in September 2018. She was on letrozole from July 2018 to July 2023. Currently, there are no signs of recurrence or new concerns.    She has macular degeneration affecting her near vision and receives Eylea injections every six weeks. Her vision in one eye is stable, and the other is reportedly improving. She uses a tablet with a larger font to read.    She reports weight gain, attributing it to the hot weather making it difficult to walk. She experiences some ankle swelling, which she manages by ensuring adequate water intake. She is on metoprolol and amlodipine for her heart condition, with her pacemaker functioning 90% of the time.    No new pains, stable appetite, no abdominal issues, and no breathing difficulties. She has a history of multiple benign seborrheic keratoses, which she describes as 'moles everywhere'.      Review of Systems:   Constitutional: Negative for anorexia, fevers, chills, night sweats and weight loss.  Respiratory: Negative for cough, hemoptysis, chest pain, or dyspnea.  Cardiovascular: Negative for angina, orthopnea or palpitations.  Gastrointestinal: Negative for nausea, vomiting, change in bowel habits, diarrhea, constipation and abdominal pain.  Hematologic/lymphatic: Negative for easy bruising, bleeding, and lymphadenopathy.  Musculoskeletal: Negative for myalgias, arthralgias, muscle  weakness.  Genitourinary: Negative for dysuria or hematuria  Psychiatric: The patient's mood was calm and appropriate for this visit.  The pertinent positives and negatives were described. All other systems were negative.    PMH/PSH:  Past Medical History[2]    Past Surgical History[3]    Family History Reviewed:  Family History[4]    Allergies:     Allergies[5]    Medications:  Current Medications[6]       Vital Signs:      Height: 162.6 cm (5' 4.02\") (06/27 1249)  Weight: 76.5 kg (168 lb 9.6 oz) (06/27 1249)  BSA (Calculated - sq m): 1.82 sq meters (06/27 1249)  Pulse: 69 (06/27 1249)  BP: 179/82 (06/27 1249)  Temp: 97.6 °F (36.4 °C) (06/27 1249)  Do Not Use - Resp Rate: --  SpO2: 95 % (06/27 1249)      Performance Status:  ECOG 0: Fully active, able to carry on all pre-disease performance without restriction     Physical Examination:      Constitutional: Patient is alert and oriented x 3, not in acute distress.  Eyes: Anicteric sclera, pink conjunctiva.  HEENT:  Oropharynx is clear. Neck is supple.  Respiratory: Clear to auscultation and percussion. No rales.  No wheezes.  Cardiovascular: Regular rate and rhythm. No murmurs.  Chest: No suspicious skin lesion and no mass.  Gastrointestinal: Soft, non tender with good bowel sounds.  Musculoskeletal: No edema. No calf tenderness.  SKIN: Multiple seborrheic keratoses with benign appearance.  Lymphatics: There is no palpable lymphadenopathy throughout in the cervical, supraclavicular, or axillary regions.  Psychiatric: The patient's mood is calm and appropriate for this visit.           Results reviewed at this visit:     Lab Results   Component Value Date    WBC 7.9 07/18/2023    RBC 5.24 07/18/2023    HGB 15.1 07/18/2023    HCT 47.2 07/18/2023    MCV 90.1 07/18/2023    MCH 28.8 07/18/2023    MCHC 32.0 07/18/2023    RDW 13.9 07/18/2023    .0 07/18/2023     Lab Results   Component Value Date     07/18/2023    K 4.4 07/18/2023     07/18/2023    CO2  31.0 07/18/2023    BUN 24 (H) 07/18/2023    CREATSERUM 0.99 07/18/2023    GLU 91 07/18/2023    CA 9.6 07/18/2023    ALKPHO 68 07/18/2023    ALT 24 07/18/2023    AST 24 07/18/2023    BILT 0.5 07/18/2023    ALB 3.6 07/18/2023    TP 7.1 07/18/2023       Results  LABS  Urine culture: negative (03/2025)         Assessment & Plan  Bilateral breast cancer:  Left breast cancer in overlapping quadrants:  HER2 positive  6 cm cancer with 1/7 nodes positive (extranodal extension)  Right breast cancer, low grade  ER/TX positive  HER2 2+\=  Taxol/Herceptin/Pertuzumab x 12 started on 2/23/2018  Completed in 2/2019  Left chest wall radiation completed in 9/2018  Letrozole started in 7/2018 and stopped in 7/2023    Bilateral breast cancer with left breast HER2 positive cancer with one of seven lymph nodes positive, measuring 0.6 cm, and right breast low-grade estrogen receptor positive, HER2 negative tumor. Completed Taxol, Herceptin, and pertuzumab treatment in February 2019 and left chest wall radiation in September 2018. Completed letrozole in July 2023. Currently, no new symptoms or concerns related to breast cancer.  - Continue routine yearly follow-up for breast cancer surveillance.    Hypertension  Hypertension managed with amlodipine and recently added metoprolol due to pacemaker alerts. Occasional ankle swelling, possibly related to fluid retention.    Pacemaker dependence  Pacemaker functioning 90% of the time. Recent addition of metoprolol due to alerts from pacemaker monitoring.    Age-related macular degeneration  Age-related macular degeneration affecting near vision. Currently receiving Eylea injections every six weeks. Vision well-managed with current treatment.           The following individual(s) verbally consented to be recorded using ambient AI listening technology and understand that they can each withdraw their consent to this listening technology at any point by asking the clinician to turn off or pause the  recording:    Patient name: Zayra Francois  Additional names:  Nehal Brito MD          [1]   Patient Active Problem List  Diagnosis    Carcinoma of overlapping sites of left breast in female, estrogen receptor positive (HCC)    Osteoporosis    Osteoarthrosis    Encounter for monitoring cardiotoxic drug therapy    Neoplasm of right breast, regional lymph node staging category pN0 per American Joint Committee on Cancer Staging Guidelines, 7th edition (HCC)   [2]   Past Medical History:   Breast cancer (HCC)    High blood pressure   [3]   Past Surgical History:  Procedure Laterality Date    Cataract      Hysterectomy  1975    partial    Knee replacement surgery  2008    R knee    Knee replacement surgery  2011    L knee    Mastectomy left      Mastectomy right      Tonsillectomy     [4]   Family History  Problem Relation Age of Onset    Cancer Paternal Grandmother 83        Breast     Cancer Brother 70        Prostate    [5]   Allergies  Allergen Reactions    Penicillins     Tetanus Toxoids     Gluten Meal DIARRHEA   [6]    metoprolol succinate ER 25 MG Oral Tablet 24 Hr Take 1 tablet (25 mg total) by mouth daily.      amLODIPine 2.5 MG Oral Tab Take 1 tablet (2.5 mg total) by mouth in the morning.      dicyclomine 10 MG Oral Cap Take 1 capsule (10 mg total) by mouth in the morning and 1 capsule (10 mg total) in the evening and 1 capsule (10 mg total) before bedtime.      acetaminophen 500 MG Oral Tab Take 1 tablet (500 mg total) by mouth.      Vitamin C 500 MG Oral Tab Take 1 tablet (500 mg total) by mouth in the morning.      CALCIUM OR Take 600 mg by mouth in the morning.      loratadine 10 MG Oral Tab Take 1 tablet (10 mg total) by mouth daily as needed.      Misc Natural Products (GLUCOSAMINE CHOND COMPLEX/MSM OR) Take by mouth in the morning.      Multiple Vitamin (MULTI-VITAMIN) Oral Tab Take by mouth in the morning.      Multiple Vitamins-Minerals (EYE VITAMINS OR) Take by mouth in the  morning.

## (undated) NOTE — LETTER
Printed: 2018    Patient Name: Kiana Salazar  : 1930   Medical Record #: [id number]    Consent to Chemotherapy    I, Kiana Salazar, understand that I have been diagnosed with Breast Cancer.      I understand that the treatment suggested by my